# Patient Record
Sex: MALE | Race: WHITE | NOT HISPANIC OR LATINO | Employment: FULL TIME
[De-identification: names, ages, dates, MRNs, and addresses within clinical notes are randomized per-mention and may not be internally consistent; named-entity substitution may affect disease eponyms.]

---

## 2017-01-18 ENCOUNTER — RECORDS - HEALTHEAST (OUTPATIENT)
Dept: ADMINISTRATIVE | Facility: OTHER | Age: 27
End: 2017-01-18

## 2017-04-26 ENCOUNTER — RECORDS - HEALTHEAST (OUTPATIENT)
Dept: ADMINISTRATIVE | Facility: OTHER | Age: 27
End: 2017-04-26

## 2017-09-15 ENCOUNTER — RECORDS - HEALTHEAST (OUTPATIENT)
Dept: ADMINISTRATIVE | Facility: OTHER | Age: 27
End: 2017-09-15

## 2017-10-30 ENCOUNTER — RECORDS - HEALTHEAST (OUTPATIENT)
Dept: ADMINISTRATIVE | Facility: OTHER | Age: 27
End: 2017-10-30

## 2017-12-20 ENCOUNTER — RECORDS - HEALTHEAST (OUTPATIENT)
Dept: ADMINISTRATIVE | Facility: OTHER | Age: 27
End: 2017-12-20

## 2018-01-26 ENCOUNTER — RECORDS - HEALTHEAST (OUTPATIENT)
Dept: ADMINISTRATIVE | Facility: OTHER | Age: 28
End: 2018-01-26

## 2018-03-09 ENCOUNTER — RECORDS - HEALTHEAST (OUTPATIENT)
Dept: ADMINISTRATIVE | Facility: OTHER | Age: 28
End: 2018-03-09

## 2018-04-24 ENCOUNTER — RECORDS - HEALTHEAST (OUTPATIENT)
Dept: ADMINISTRATIVE | Facility: OTHER | Age: 28
End: 2018-04-24

## 2018-09-26 ENCOUNTER — RECORDS - HEALTHEAST (OUTPATIENT)
Dept: ADMINISTRATIVE | Facility: OTHER | Age: 28
End: 2018-09-26

## 2018-11-07 ENCOUNTER — OFFICE VISIT (OUTPATIENT)
Dept: INTERNAL MEDICINE | Facility: CLINIC | Age: 28
End: 2018-11-07
Payer: COMMERCIAL

## 2018-11-07 VITALS
WEIGHT: 188.5 LBS | BODY MASS INDEX: 22.95 KG/M2 | OXYGEN SATURATION: 97 % | TEMPERATURE: 98.7 F | RESPIRATION RATE: 16 BRPM | HEART RATE: 95 BPM | SYSTOLIC BLOOD PRESSURE: 128 MMHG | HEIGHT: 76 IN | DIASTOLIC BLOOD PRESSURE: 62 MMHG

## 2018-11-07 DIAGNOSIS — F32.A DEPRESSIVE DISORDER: ICD-10-CM

## 2018-11-07 DIAGNOSIS — Z00.00 HEALTH CARE MAINTENANCE: Primary | ICD-10-CM

## 2018-11-07 DIAGNOSIS — F41.9 ANXIETY: ICD-10-CM

## 2018-11-07 LAB
ALT SERPL W P-5'-P-CCNC: 47 U/L (ref 0–70)
ANION GAP SERPL CALCULATED.3IONS-SCNC: 8 MMOL/L (ref 3–14)
BUN SERPL-MCNC: 11 MG/DL (ref 7–30)
CALCIUM SERPL-MCNC: 9.5 MG/DL (ref 8.5–10.1)
CHLORIDE SERPL-SCNC: 103 MMOL/L (ref 94–109)
CHOLEST SERPL-MCNC: 236 MG/DL
CO2 SERPL-SCNC: 28 MMOL/L (ref 20–32)
CREAT SERPL-MCNC: 0.96 MG/DL (ref 0.66–1.25)
ERYTHROCYTE [DISTWIDTH] IN BLOOD BY AUTOMATED COUNT: 12.3 % (ref 10–15)
GFR SERPL CREATININE-BSD FRML MDRD: >90 ML/MIN/1.7M2
GLUCOSE SERPL-MCNC: 75 MG/DL (ref 70–99)
HCT VFR BLD AUTO: 43.8 % (ref 40–53)
HDLC SERPL-MCNC: 39 MG/DL
HGB BLD-MCNC: 15.2 G/DL (ref 13.3–17.7)
LDLC SERPL CALC-MCNC: 153 MG/DL
MCH RBC QN AUTO: 32.2 PG (ref 26.5–33)
MCHC RBC AUTO-ENTMCNC: 34.7 G/DL (ref 31.5–36.5)
MCV RBC AUTO: 93 FL (ref 78–100)
NONHDLC SERPL-MCNC: 197 MG/DL
PLATELET # BLD AUTO: 179 10E9/L (ref 150–450)
POTASSIUM SERPL-SCNC: 4 MMOL/L (ref 3.4–5.3)
RBC # BLD AUTO: 4.72 10E12/L (ref 4.4–5.9)
SODIUM SERPL-SCNC: 139 MMOL/L (ref 133–144)
TRIGL SERPL-MCNC: 219 MG/DL
TSH SERPL DL<=0.005 MIU/L-ACNC: 2.56 MU/L (ref 0.4–4)
WBC # BLD AUTO: 5.3 10E9/L (ref 4–11)

## 2018-11-07 PROCEDURE — 99395 PREV VISIT EST AGE 18-39: CPT | Performed by: NURSE PRACTITIONER

## 2018-11-07 PROCEDURE — 85027 COMPLETE CBC AUTOMATED: CPT | Performed by: NURSE PRACTITIONER

## 2018-11-07 PROCEDURE — 99385 PREV VISIT NEW AGE 18-39: CPT | Performed by: NURSE PRACTITIONER

## 2018-11-07 PROCEDURE — 80061 LIPID PANEL: CPT | Performed by: NURSE PRACTITIONER

## 2018-11-07 PROCEDURE — 36415 COLL VENOUS BLD VENIPUNCTURE: CPT | Performed by: NURSE PRACTITIONER

## 2018-11-07 PROCEDURE — 84460 ALANINE AMINO (ALT) (SGPT): CPT | Performed by: NURSE PRACTITIONER

## 2018-11-07 PROCEDURE — 84443 ASSAY THYROID STIM HORMONE: CPT | Performed by: NURSE PRACTITIONER

## 2018-11-07 PROCEDURE — 80048 BASIC METABOLIC PNL TOTAL CA: CPT | Performed by: NURSE PRACTITIONER

## 2018-11-07 RX ORDER — ESCITALOPRAM OXALATE 10 MG/1
10 TABLET ORAL DAILY
Qty: 90 TABLET | Refills: 1 | Status: SHIPPED | OUTPATIENT
Start: 2018-11-07 | End: 2021-08-03

## 2018-11-07 ASSESSMENT — ENCOUNTER SYMPTOMS
ABDOMINAL PAIN: 0
ARTHRALGIAS: 0
NAUSEA: 0
HEMATOCHEZIA: 0
CHILLS: 0
DIARRHEA: 0
EYE PAIN: 0
FEVER: 0
HEARTBURN: 1
FREQUENCY: 0
NERVOUS/ANXIOUS: 1
HEADACHES: 0
SHORTNESS OF BREATH: 0
WEAKNESS: 0
COUGH: 0
CONSTIPATION: 0
DYSURIA: 0
SORE THROAT: 0
PALPITATIONS: 0
HEMATURIA: 0
DIZZINESS: 1
PARESTHESIAS: 0
MYALGIAS: 0
JOINT SWELLING: 0

## 2018-11-07 ASSESSMENT — ANXIETY QUESTIONNAIRES
IF YOU CHECKED OFF ANY PROBLEMS ON THIS QUESTIONNAIRE, HOW DIFFICULT HAVE THESE PROBLEMS MADE IT FOR YOU TO DO YOUR WORK, TAKE CARE OF THINGS AT HOME, OR GET ALONG WITH OTHER PEOPLE: SOMEWHAT DIFFICULT
6. BECOMING EASILY ANNOYED OR IRRITABLE: NEARLY EVERY DAY
GAD7 TOTAL SCORE: 14
5. BEING SO RESTLESS THAT IT IS HARD TO SIT STILL: SEVERAL DAYS
1. FEELING NERVOUS, ANXIOUS, OR ON EDGE: NEARLY EVERY DAY
7. FEELING AFRAID AS IF SOMETHING AWFUL MIGHT HAPPEN: MORE THAN HALF THE DAYS
2. NOT BEING ABLE TO STOP OR CONTROL WORRYING: SEVERAL DAYS
3. WORRYING TOO MUCH ABOUT DIFFERENT THINGS: MORE THAN HALF THE DAYS

## 2018-11-07 ASSESSMENT — PATIENT HEALTH QUESTIONNAIRE - PHQ9
SUM OF ALL RESPONSES TO PHQ QUESTIONS 1-9: 13
5. POOR APPETITE OR OVEREATING: MORE THAN HALF THE DAYS
10. IF YOU CHECKED OFF ANY PROBLEMS, HOW DIFFICULT HAVE THESE PROBLEMS MADE IT FOR YOU TO DO YOUR WORK, TAKE CARE OF THINGS AT HOME, OR GET ALONG WITH OTHER PEOPLE: SOMEWHAT DIFFICULT
SUM OF ALL RESPONSES TO PHQ QUESTIONS 1-9: 13

## 2018-11-07 NOTE — MR AVS SNAPSHOT
After Visit Summary   11/7/2018    Landen Biswas    MRN: [id number]           Patient Information     Date Of Birth          1990        Visit Information        Provider Department      11/7/2018 7:00 AM Lilly Hester NP [facility]        Today's Diagnoses     Health care maintenance    -  1    Depressive disorder        Anxiety          Care Instructions      Preventive Health Recommendations  Male Ages 26 - 39    Yearly exam:             See your health care provider every year in order to  o   Review health changes.   o   Discuss preventive care.    o   Review your medicines if your doctor has prescribed any.    You should be tested each year for STDs (sexually transmitted diseases), if you re at risk.     After age 35, talk to your provider about cholesterol testing. If you are at risk for heart disease, have your cholesterol tested at least every 5 years.     If you are at risk for diabetes, you should have a diabetes test (fasting glucose).  Shots: Get a flu shot each year. Get a tetanus shot every 10 years.     Nutrition:    Eat at least 5 servings of fruits and vegetables daily.     Eat whole-grain bread, whole-wheat pasta and brown rice instead of white grains and rice.     Get adequate Calcium and Vitamin D.     Lifestyle    Exercise for at least 150 minutes a week (30 minutes a day, 5 days a week). This will help you control your weight and prevent disease.     Limit alcohol to one drink per day.     No smoking.     Wear sunscreen to prevent skin cancer.     See your dentist every six months for an exam and cleaning.             Follow-ups after your visit        Follow-up notes from your care team     Return in about 3 months (around 2/7/2019).      Who to contact     If you have questions or need follow up information about today's clinic visit or your schedule please contact [facility] directly at [phone].  Normal or non-critical lab and  "imaging results will be communicated to you by MyChart, letter or phone within 4 business days after the clinic has received the results. If you do not hear from us within 7 days, please contact the clinic through MyChart or phone. If you have a critical or abnormal lab result, we will notify you by phone as soon as possible.  Submit refill requests through [facility] or call your pharmacy and they will forward the refill request to us. Please allow 3 business days for your refill to be completed.          Additional Information About Your Visit        Care EveryWhere ID     This is your Care EveryWhere ID. This could be used by other organizations to access your [place] medical records  [id number]        Your Vitals Were     Pulse Temperature Respirations Height Pulse Oximetry BMI (Body Mass Index)    95 98.7  F (37.1  C) (Oral) 16 6' 4\" (1.93 m) 97% 22.94 kg/m2       Blood Pressure from Last 3 Encounters:   11/07/18 128/62    Weight from Last 3 Encounters:   11/07/18 188 lb 8 oz (85.5 kg)              We Performed the Following     ALT     Basic metabolic panel     CBC with platelets     Lipid Profile     TSH with free T4 reflex          Today's Medication Changes          These changes are accurate as of 11/7/18  8:12 AM.  If you have any questions, ask your nurse or doctor.               These medicines have changed or have updated prescriptions.        Dose/Directions    escitalopram 10 MG tablet   Commonly known as:  LEXAPRO   This may have changed:    - medication strength  - how much to take   Used for:  Anxiety, Depressive disorder   Changed by:  Lilly Hester, JOSHUA        Dose:  10 mg   Take 1 tablet (10 mg) by mouth daily   Quantity:  90 tablet   Refills:  1            Where to get your medicines      These medications were sent to [facility]/pharmacy #[id number] [place], [place] - [address], [place] [address]     Phone:  [phone]     escitalopram 10 MG tablet             "    Primary Care Provider Fax #    Physician No Ref-Primary [phone]       No address on file        Equal Access to Services     JOSE R CARROLL : Hadii aad ku hadkesha Hernandez, raquelda jovanybrandy, tiffany patelda zoltan, roscoe courtneyin hayaaernestine abarcachristopher cronin laMerakamaljit mujica. So [facility] [phone].    ATENCIÓN: Si habla español, tiene a flores disposición servicios gratuitos de asistencia lingüística. Llame al [phone].    We comply with applicable federal civil rights laws and [place] laws. We do not discriminate on the basis of race, color, national origin, age, disability, sex, sexual orientation, or gender identity.            Thank you!     Thank you for choosing [facility]  for your care. Our goal is always to provide you with excellent care. Hearing back from our patients is one way we can continue to improve our services. Please take a few minutes to complete the written survey that you may receive in the mail after your visit with us. Thank you!             Your Updated Medication List - Protect others around you: Learn how to safely use, store and throw away your medicines at www.disposemymeds.org.          This list is accurate as of 11/7/18  8:12 AM.  Always use your most recent med list.                   Brand Name Dispense Instructions for use Diagnosis    escitalopram 10 MG tablet    LEXAPRO    90 tablet    Take 1 tablet (10 mg) by mouth daily    Anxiety, Depressive disorder       XANAX PO      Take 0.5 mg by mouth as needed for anxiety

## 2018-11-07 NOTE — LETTER
November 7, 2018      Landen Biswas  [address]  [place] [address]        Dear ,    We are writing to inform you of your test results.    Your cholesterol is significantly elevated. I would like you to work harder on your diet for now. You will need a follow up fasting cholesterol in 6 months. The rest of your results were in normal range.     Resulted Orders   CBC with platelets   Result Value Ref Range    WBC 5.3 4.0 - 11.0 10e9/L    RBC Count 4.72 4.4 - 5.9 10e12/L    Hemoglobin 15.2 13.3 - 17.7 g/dL    Hematocrit 43.8 40.0 - 53.0 %    MCV 93 78 - 100 fl    MCH 32.2 26.5 - 33.0 pg    MCHC 34.7 31.5 - 36.5 g/dL    RDW 12.3 10.0 - 15.0 %    Platelet Count 179 150 - 450 10e9/L   Basic metabolic panel   Result Value Ref Range    Sodium 139 133 - 144 mmol/L    Potassium 4.0 3.4 - 5.3 mmol/L    Chloride 103 94 - 109 mmol/L    Carbon Dioxide 28 20 - 32 mmol/L    Anion Gap 8 3 - 14 mmol/L    Glucose 75 70 - 99 mg/dL      Comment:      Fasting specimen    Urea Nitrogen 11 7 - 30 mg/dL    Creatinine 0.96 0.66 - 1.25 mg/dL    GFR Estimate >90 >60 mL/min/1.7m2      Comment:      Non  GFR Calc    GFR Estimate If Black >90 >60 mL/min/1.7m2      Comment:       GFR Calc    Calcium 9.5 8.5 - 10.1 mg/dL   ALT   Result Value Ref Range    ALT 47 0 - 70 U/L   Lipid Profile   Result Value Ref Range    Cholesterol 236 (H) <200 mg/dL      Comment:      Desirable:       <200 mg/dl    Triglycerides 219 (H) <150 mg/dL      Comment:      Borderline high:  150-199 mg/dl  High:             200-499 mg/dl  Very high:       >499 mg/dl  Fasting specimen      HDL Cholesterol 39 (L) >39 mg/dL    LDL Cholesterol Calculated 153 (H) <100 mg/dL      Comment:      Above desirable:  100-129 mg/dl  Borderline High:  130-159 mg/dL  High:             160-189 mg/dL  Very high:       >189 mg/dl      Non HDL Cholesterol 197 (H) <130 mg/dL      Comment:      Above Desirable:  130-159 mg/dl  Borderline high:   160-189 mg/dl  High:             190-219 mg/dl  Very high:       >219 mg/dl     TSH with free T4 reflex   Result Value Ref Range    TSH 2.56 0.40 - 4.00 mU/L       If you have any questions or concerns, please call the clinic at the number listed above.       Sincerely,        Lilly Hester NP

## 2018-11-07 NOTE — PROGRESS NOTES
SUBJECTIVE:   CC: Landen Biswas is an 28 year old male who presents for preventative health visit.     Physical   Annual:     Getting at least 3 servings of Calcium per day:  Yes    Bi-annual eye exam:  Yes    Dental care twice a year:  Yes    Sleep apnea or symptoms of sleep apnea:  Sleep apnea    Diet:  Regular (no restrictions)    Frequency of exercise:  1 day/week    Duration of exercise:  15-30 minutes    Taking medications regularly:  Yes    Medication side effects:  None    Additional concerns today:  Yes        Depression and Anxiety Follow-Up    Status since last visit: No change    Other associated symptoms:None    Complicating factors:     Significant life event: recent relocation to [place]     Current substance abuse: None    PHQ 11/7/2018   PHQ-9 Total Score 13   Q9: Suicide Ideation Not at all     No flowsheet data found.    PHQ-9  English  PHQ-9   Any Language  CIERRA-7  Suicide Assessment Five-step Evaluation and Treatment (SAFE-T)    Today's PHQ-2 Score:   PHQ-2 ( 1999 [employer]) 11/7/2018   Q1: Little interest or pleasure in doing things 2   Q2: Feeling down, depressed or hopeless 2   PHQ-2 Score 4   Q1: Little interest or pleasure in doing things More than half the days   Q2: Feeling down, depressed or hopeless More than half the days   PHQ-2 Score 4       Abuse: Current or Past(Physical, Sexual or Emotional)- No  Do you feel safe in your environment - Yes    Social History   Substance Use Topics     Smoking status: Current Every Day Smoker     Smokeless tobacco: Never Used     Alcohol use Yes      Comment: socially     Alcohol Use 11/7/2018   If you drink alcohol do you typically have greater than 3 drinks per day OR greater than 7 drinks per week? No       Last PSA: No results found for: PSA    Reviewed orders with patient. Reviewed health maintenance and updated orders accordingly - Yes  BP Readings from Last 3 Encounters:   11/07/18 128/62    Wt Readings from Last 3 Encounters:   11/07/18 188 lb 8 oz (85.5  kg)                  Patient Active Problem List   Diagnosis     Depressive disorder     Anxiety     Past Surgical History:   Procedure Laterality Date     TONSILLECTOMY         Social History   Substance Use Topics     Smoking status: Current Every Day Smoker     Packs/day: 0.50     Years: 8.00     Smokeless tobacco: Never Used     Alcohol use Yes      Comment: 5 per week     Family History   Problem Relation Age of Onset     Chronic Obstructive Pulmonary Disease Maternal Grandmother          Current Outpatient Prescriptions   Medication Sig Dispense Refill     ALPRAZolam (XANAX PO) Take 0.5 mg by mouth as needed for anxiety       escitalopram (LEXAPRO) 10 MG tablet Take 1 tablet (10 mg) by mouth daily 90 tablet 1     [DISCONTINUED] Escitalopram Oxalate (LEXAPRO PO) Take 5 mg by mouth daily         Reviewed and updated as needed this visit by clinical staff  Tobacco  Allergies  Meds  Med Hx  Surg Hx  Fam Hx  Soc Hx        Reviewed and updated as needed this visit by Provider            Review of Systems   Constitutional: Negative for chills and fever.   HENT: Positive for congestion. Negative for ear pain, hearing loss and sore throat.    Eyes: Negative for pain and visual disturbance.   Respiratory: Negative for cough and shortness of breath.    Cardiovascular: Negative for chest pain, palpitations and peripheral edema.   Gastrointestinal: Positive for heartburn. Negative for abdominal pain, constipation, diarrhea, hematochezia and nausea.   Genitourinary: Negative for discharge, dysuria, frequency, genital sores, hematuria, impotence and urgency.   Musculoskeletal: Negative for arthralgias, joint swelling and myalgias.   Skin: Negative for rash.   Neurological: Positive for dizziness. Negative for weakness, headaches and paresthesias.   Psychiatric/Behavioral: Positive for mood changes. The patient is nervous/anxious.          OBJECTIVE:   /62 (BP Location: Right arm, Patient Position: Sitting, Cuff  "Size: Adult Large)  Pulse 95  Temp 98.7  F (37.1  C) (Oral)  Resp 16  Ht 6' 4\" (1.93 m)  Wt 188 lb 8 oz (85.5 kg)  SpO2 97%  BMI 22.94 kg/m2    Physical Exam  GENERAL: healthy, alert and no distress  NECK: no adenopathy, no asymmetry, masses, or scars and thyroid normal to palpation  RESP: lungs clear to auscultation - no rales, rhonchi or wheezes  CV: regular rate and rhythm, normal S1 S2, no S3 or S4, no murmur, click or rub, no peripheral edema and peripheral pulses strong  ABDOMEN: soft, nontender, no hepatosplenomegaly, no masses and bowel sounds normal  MS: no gross musculoskeletal defects noted, no edema  PSYCH: mentation appears normal, affect normal/bright and anxious        ASSESSMENT/PLAN:       ICD-10-CM    1. Health care maintenance Z00.00 CBC with platelets     Basic metabolic panel     ALT     Lipid Profile     TSH with free T4 reflex   2. Depressive disorder F32.9 escitalopram (LEXAPRO) 10 MG tablet   3. Anxiety F41.9 escitalopram (LEXAPRO) 10 MG tablet       COUNSELING:   Reviewed preventive health counseling, as reflected in patient instructions    BP Readings from Last 1 Encounters:   11/07/18 128/62     Estimated body mass index is 22.94 kg/(m^2) as calculated from the following:    Height as of this encounter: 6' 4\" (1.93 m).    Weight as of this encounter: 188 lb 8 oz (85.5 kg).           reports that he has been smoking.  He has never used smokeless tobacco.  Tobacco Cessation Action Plan: Information offered: Patient not interested at this time    Counseling Resources:  ATP IV Guidelines  Pooled Cohorts Equation Calculator  FRAX Risk Assessment  ICSI Preventive Guidelines  Dietary Guidelines for Americans, 2010  USDA's MyPlate  ASA Prophylaxis  Lung CA Screening    Lilly Hester NP  [facility]  Answers for HPI/ROS submitted by the patient on 11/7/2018   PHQ-2 Score: 4  If you checked off any problems, how difficult have these problems made it for you to do your " work, take care of things at home, or get along with other people?: Somewhat difficult  PHQ9 TOTAL SCORE: 13

## 2018-11-08 ASSESSMENT — ANXIETY QUESTIONNAIRES: GAD7 TOTAL SCORE: 14

## 2019-03-19 ENCOUNTER — OFFICE VISIT - HEALTHEAST (OUTPATIENT)
Dept: INTERNAL MEDICINE | Facility: CLINIC | Age: 29
End: 2019-03-19

## 2019-03-19 ENCOUNTER — COMMUNICATION - HEALTHEAST (OUTPATIENT)
Dept: INTERNAL MEDICINE | Facility: CLINIC | Age: 29
End: 2019-03-19

## 2019-03-19 ENCOUNTER — COMMUNICATION - HEALTHEAST (OUTPATIENT)
Dept: TELEHEALTH | Facility: CLINIC | Age: 29
End: 2019-03-19

## 2019-03-19 DIAGNOSIS — R03.0 PREHYPERTENSION: ICD-10-CM

## 2019-03-19 DIAGNOSIS — F41.9 ANXIETY: ICD-10-CM

## 2019-03-19 DIAGNOSIS — F33.0 MILD EPISODE OF RECURRENT MAJOR DEPRESSIVE DISORDER (H): ICD-10-CM

## 2019-03-19 DIAGNOSIS — Z00.00 ROUTINE GENERAL MEDICAL EXAMINATION AT A HEALTH CARE FACILITY: ICD-10-CM

## 2019-03-19 LAB
ALBUMIN SERPL-MCNC: 4.3 G/DL (ref 3.5–5)
ALBUMIN UR-MCNC: NEGATIVE MG/DL
ALP SERPL-CCNC: 70 U/L (ref 45–120)
ALT SERPL W P-5'-P-CCNC: 60 U/L (ref 0–45)
ANION GAP SERPL CALCULATED.3IONS-SCNC: 7 MMOL/L (ref 5–18)
APPEARANCE UR: CLEAR
AST SERPL W P-5'-P-CCNC: 39 U/L (ref 0–40)
BACTERIA #/AREA URNS HPF: ABNORMAL HPF
BILIRUB SERPL-MCNC: 1.3 MG/DL (ref 0–1)
BILIRUB UR QL STRIP: NEGATIVE
BUN SERPL-MCNC: 10 MG/DL (ref 8–22)
CALCIUM SERPL-MCNC: 9.9 MG/DL (ref 8.5–10.5)
CHLORIDE BLD-SCNC: 104 MMOL/L (ref 98–107)
CHOLEST SERPL-MCNC: 220 MG/DL
CO2 SERPL-SCNC: 28 MMOL/L (ref 22–31)
COLOR UR AUTO: YELLOW
CREAT SERPL-MCNC: 0.89 MG/DL (ref 0.7–1.3)
ERYTHROCYTE [DISTWIDTH] IN BLOOD BY AUTOMATED COUNT: 11.8 % (ref 11–14.5)
FASTING STATUS PATIENT QL REPORTED: YES
GFR SERPL CREATININE-BSD FRML MDRD: >60 ML/MIN/1.73M2
GLUCOSE BLD-MCNC: 85 MG/DL (ref 70–125)
GLUCOSE UR STRIP-MCNC: NEGATIVE MG/DL
HCT VFR BLD AUTO: 44.2 % (ref 40–54)
HDLC SERPL-MCNC: 37 MG/DL
HGB BLD-MCNC: 15.4 G/DL (ref 14–18)
HGB UR QL STRIP: ABNORMAL
KETONES UR STRIP-MCNC: NEGATIVE MG/DL
LDLC SERPL CALC-MCNC: 147 MG/DL
LEUKOCYTE ESTERASE UR QL STRIP: NEGATIVE
MCH RBC QN AUTO: 31.5 PG (ref 27–34)
MCHC RBC AUTO-ENTMCNC: 34.8 G/DL (ref 32–36)
MCV RBC AUTO: 90 FL (ref 80–100)
NITRATE UR QL: NEGATIVE
PH UR STRIP: 8.5 [PH] (ref 5–8)
PLATELET # BLD AUTO: 191 THOU/UL (ref 140–440)
PMV BLD AUTO: 8.5 FL (ref 7–10)
POTASSIUM BLD-SCNC: 4.1 MMOL/L (ref 3.5–5)
PROT SERPL-MCNC: 7.1 G/DL (ref 6–8)
RBC # BLD AUTO: 4.89 MILL/UL (ref 4.4–6.2)
RBC #/AREA URNS AUTO: ABNORMAL HPF
SODIUM SERPL-SCNC: 139 MMOL/L (ref 136–145)
SP GR UR STRIP: 1.01 (ref 1–1.03)
SQUAMOUS #/AREA URNS AUTO: ABNORMAL LPF
TRIGL SERPL-MCNC: 179 MG/DL
UROBILINOGEN UR STRIP-ACNC: ABNORMAL
WBC #/AREA URNS AUTO: ABNORMAL HPF
WBC: 5.6 THOU/UL (ref 4–11)

## 2019-03-19 ASSESSMENT — MIFFLIN-ST. JEOR: SCORE: 1894.52

## 2019-03-20 ENCOUNTER — COMMUNICATION - HEALTHEAST (OUTPATIENT)
Dept: INTERNAL MEDICINE | Facility: CLINIC | Age: 29
End: 2019-03-20

## 2019-04-16 ENCOUNTER — OFFICE VISIT - HEALTHEAST (OUTPATIENT)
Dept: INTERNAL MEDICINE | Facility: CLINIC | Age: 29
End: 2019-04-16

## 2019-04-16 DIAGNOSIS — L02.31 ABSCESS OF LEFT BUTTOCK: ICD-10-CM

## 2019-04-16 ASSESSMENT — MIFFLIN-ST. JEOR: SCORE: 1904.14

## 2019-04-19 ENCOUNTER — OFFICE VISIT - HEALTHEAST (OUTPATIENT)
Dept: SURGERY | Facility: CLINIC | Age: 29
End: 2019-04-19

## 2019-04-19 DIAGNOSIS — L02.31 CUTANEOUS ABSCESS OF BUTTOCK: ICD-10-CM

## 2019-04-19 ASSESSMENT — MIFFLIN-ST. JEOR: SCORE: 1894.52

## 2019-05-29 ENCOUNTER — COMMUNICATION - HEALTHEAST (OUTPATIENT)
Dept: INTERNAL MEDICINE | Facility: CLINIC | Age: 29
End: 2019-05-29

## 2019-05-29 DIAGNOSIS — F41.9 ANXIETY: ICD-10-CM

## 2019-12-26 ENCOUNTER — COMMUNICATION - HEALTHEAST (OUTPATIENT)
Dept: INTERNAL MEDICINE | Facility: CLINIC | Age: 29
End: 2019-12-26

## 2019-12-26 DIAGNOSIS — F41.9 ANXIETY: ICD-10-CM

## 2020-02-18 ENCOUNTER — OFFICE VISIT - HEALTHEAST (OUTPATIENT)
Dept: INTERNAL MEDICINE | Facility: CLINIC | Age: 30
End: 2020-02-18

## 2020-02-18 DIAGNOSIS — E78.2 MIXED HYPERLIPIDEMIA: ICD-10-CM

## 2020-02-18 DIAGNOSIS — R03.0 ELEVATED BLOOD PRESSURE READING WITHOUT DIAGNOSIS OF HYPERTENSION: ICD-10-CM

## 2020-02-18 DIAGNOSIS — Z23 NEED FOR PROPHYLACTIC VACCINATION AND INOCULATION AGAINST INFLUENZA: ICD-10-CM

## 2020-02-18 DIAGNOSIS — N52.9 ERECTILE DYSFUNCTION, UNSPECIFIED ERECTILE DYSFUNCTION TYPE: ICD-10-CM

## 2020-02-18 ASSESSMENT — MIFFLIN-ST. JEOR: SCORE: 1799.45

## 2020-02-18 ASSESSMENT — PATIENT HEALTH QUESTIONNAIRE - PHQ9: SUM OF ALL RESPONSES TO PHQ QUESTIONS 1-9: 3

## 2020-03-20 ENCOUNTER — OFFICE VISIT - HEALTHEAST (OUTPATIENT)
Dept: INTERNAL MEDICINE | Facility: CLINIC | Age: 30
End: 2020-03-20

## 2020-03-20 ENCOUNTER — COMMUNICATION - HEALTHEAST (OUTPATIENT)
Dept: INTERNAL MEDICINE | Facility: CLINIC | Age: 30
End: 2020-03-20

## 2020-03-20 DIAGNOSIS — B02.9 HERPES ZOSTER WITHOUT COMPLICATION: ICD-10-CM

## 2020-03-25 ENCOUNTER — COMMUNICATION - HEALTHEAST (OUTPATIENT)
Dept: INTERNAL MEDICINE | Facility: CLINIC | Age: 30
End: 2020-03-25

## 2020-03-25 DIAGNOSIS — F41.9 ANXIETY: ICD-10-CM

## 2020-03-30 ENCOUNTER — COMMUNICATION - HEALTHEAST (OUTPATIENT)
Dept: INTERNAL MEDICINE | Facility: CLINIC | Age: 30
End: 2020-03-30

## 2020-03-30 DIAGNOSIS — F41.9 ANXIETY: ICD-10-CM

## 2020-03-30 DIAGNOSIS — F32.A DEPRESSIVE DISORDER: ICD-10-CM

## 2020-05-17 ENCOUNTER — COMMUNICATION - HEALTHEAST (OUTPATIENT)
Dept: INTERNAL MEDICINE | Facility: CLINIC | Age: 30
End: 2020-05-17

## 2020-05-17 DIAGNOSIS — F41.9 ANXIETY: ICD-10-CM

## 2020-09-04 ENCOUNTER — COMMUNICATION - HEALTHEAST (OUTPATIENT)
Dept: INTERNAL MEDICINE | Facility: CLINIC | Age: 30
End: 2020-09-04

## 2020-09-04 DIAGNOSIS — F41.9 ANXIETY: ICD-10-CM

## 2021-02-02 ENCOUNTER — COMMUNICATION - HEALTHEAST (OUTPATIENT)
Dept: INTERNAL MEDICINE | Facility: CLINIC | Age: 31
End: 2021-02-02

## 2021-02-02 DIAGNOSIS — F41.9 ANXIETY: ICD-10-CM

## 2021-04-02 ENCOUNTER — COMMUNICATION - HEALTHEAST (OUTPATIENT)
Dept: INTERNAL MEDICINE | Facility: CLINIC | Age: 31
End: 2021-04-02

## 2021-04-02 DIAGNOSIS — F41.9 ANXIETY: ICD-10-CM

## 2021-04-02 DIAGNOSIS — F32.A DEPRESSIVE DISORDER: ICD-10-CM

## 2021-05-03 ENCOUNTER — RECORDS - HEALTHEAST (OUTPATIENT)
Dept: INTERNAL MEDICINE | Facility: CLINIC | Age: 31
End: 2021-05-03

## 2021-05-26 ASSESSMENT — PATIENT HEALTH QUESTIONNAIRE - PHQ9: SUM OF ALL RESPONSES TO PHQ QUESTIONS 1-9: 3

## 2021-05-27 NOTE — PROGRESS NOTES
Office Visit - Follow Up   Landen Biswas   28 y.o. male    Date of Visit: 4/16/2019    Chief Complaint   Patient presents with     Recurrent Skin Infections     LT buttocks X1 week, painful, red drainage        Assessment and Plan   1. Abscess of left buttock  Has a tender lump on the left buttock and on exam consistent with abscess.  It is fluctuant, red and mildly tender on palpation.  Will treat with cephalexin.  Advised he needs I&D for this.  Will refer to general surgery.  - cephalexin (KEFLEX) 500 MG capsule; Take 1 capsule (500 mg total) by mouth 4 (four) times a day for 10 days.  Dispense: 40 capsule; Refill: 0  - Ambulatory referral to General Surgery    Has controlled major depression with Lexapro.  Borderline blood pressure found on his last visit is now normal.    Follow up in in 3 months as previously scheduled.     History of Present Illness   This 28 y.o. old male came primarily for a tender lump on his left buttock.  The lowest started 3 days ago.  Noted some discharge.  Thinks he has a boil.  Has similar problems in the past which occur in his groin.  Denies fever.  Able to squeeze out some yellow discharge yesterday.  Afraid he has recurrence of his boil.  Has major depression now in remission with Lexapro.  Showed borderline blood pressure on his last visit but today his blood pressures well controlled.    Review of Systems   A 12 point comprehensive review of systems was negative except as noted..     Medications, Allergies and Problem List   Reviewed and updated             Chief Complaint   Recurrent Skin Infections (LT buttocks X1 week, painful, red drainage)       Patient Profile   Social History     Social History Narrative    Single. Domestic partner for 1 year. Homosexual. [employer] manager, [employer]. Used to be . Socially drinks alcohol. Smokes 1/2 pack of cigarettes a day.         Past Medical History   Patient Active Problem List   Diagnosis     Anxiety     Depressive  "disorder       Past Surgical History  He has no past surgical history on file.       Medications and Allergies   Current Outpatient Medications   Medication Sig     ALPRAZolam (XANAX) 0.5 MG tablet Take 1 tablet (0.5 mg total) by mouth once as needed for sleep or anxiety.     escitalopram oxalate (LEXAPRO) 10 MG tablet Take 1 tablet (10 mg total) by mouth daily.     cephalexin (KEFLEX) 500 MG capsule Take 1 capsule (500 mg total) by mouth 4 (four) times a day for 10 days.     No Known Allergies     Family and Social History   No family history on file.     Social History     Tobacco Use     Smoking status: Current Every Day Smoker     Packs/day: 0.50     Types: Cigarettes     Smokeless tobacco: Never Used   Substance Use Topics     Alcohol use: Yes     Alcohol/week: 1.8 oz     Types: 3 Shots of liquor per week     Frequency: Monthly or less     Drinks per session: 1 or 2     Binge frequency: Less than monthly     Drug use: Not on file        Physical Exam       Physical Exam  /72 (Patient Site: Left Arm, Patient Position: Sitting, Cuff Size: Adult Regular)   Pulse 91   Ht 6' 2.5\" (1.892 m)   Wt 192 lb 1.9 oz (87.1 kg)   SpO2 98%   BMI 24.34 kg/m    General appearance: alert, appears stated age, cooperative and no distress  Head: Normocephalic, without obvious abnormality, atraumatic  Lungs: clear to auscultation bilaterally  Heart: regular rate and rhythm, S1, S2 normal, no murmur, click, rub or gallop  Abdomen: soft, non-tender; bowel sounds normal; no masses,  no organomegaly  Extremities: extremities normal, atraumatic, no cyanosis or edema  Skin: Skin color, texture, turgor normal. No rashes or lesions  Buttocks: A fluctuant, red, mildly tender abscess on the mid area of the left buttock     Additional Information        Tank Clay MD  Internal Medicine  Contact me at [phone]     Additional Information   Current Outpatient Medications   Medication Sig     ALPRAZolam (XANAX) 0.5 MG tablet " Take 1 tablet (0.5 mg total) by mouth once as needed for sleep or anxiety.     escitalopram oxalate (LEXAPRO) 10 MG tablet Take 1 tablet (10 mg total) by mouth daily.     cephalexin (KEFLEX) 500 MG capsule Take 1 capsule (500 mg total) by mouth 4 (four) times a day for 10 days.     No Known Allergies  Social History     Tobacco Use     Smoking status: Current Every Day Smoker     Packs/day: 0.50     Types: Cigarettes     Smokeless tobacco: Never Used   Substance Use Topics     Alcohol use: Yes     Alcohol/week: 1.8 oz     Types: 3 Shots of liquor per week     Frequency: Monthly or less     Drinks per session: 1 or 2     Binge frequency: Less than monthly     Drug use: Not on file         Time: total time spent with the patient was 15 minutes of which >50% was spent in counseling and coordination of care

## 2021-05-28 NOTE — PROGRESS NOTES
HPI: Landen Biswas is a 28 y.o. male referred to see me by Tank Clay MD for evaluation of a left buttock abscess.  He developed this abscess several days ago, with drainage noted initially.  Has been quite tender to sit on.  Since seen Dr. Clay 3 days ago, he has noted improvement in pain as well as redness in the area with initiation of antibiotics.  Does still note some occasional drainage.      Has had similar cutaneous abscesses in the past, but not at this location.      Allergies:Patient has no known allergies.    Past Medical History:   Diagnosis Date     Anxiety      Depression        History reviewed. No pertinent surgical history.    CURRENT MEDS:    Current Outpatient Medications:      ALPRAZolam (XANAX) 0.5 MG tablet, Take 1 tablet (0.5 mg total) by mouth once as needed for sleep or anxiety., Disp: 30 tablet, Rfl: 0     cephalexin (KEFLEX) 500 MG capsule, Take 1 capsule (500 mg total) by mouth 4 (four) times a day for 10 days., Disp: 40 capsule, Rfl: 0     escitalopram oxalate (LEXAPRO) 10 MG tablet, Take 1 tablet (10 mg total) by mouth daily., Disp: 90 tablet, Rfl: 3    History reviewed. No pertinent family history.     reports that he has been smoking cigarettes.  He has been smoking about 0.50 packs per day. He has never used smokeless tobacco. He reports that he drinks about 1.8 oz of alcohol per week.    Review of Systems:  The 10 point review of systems  is within normal limits except for as mentioned above in the HPI.  General ROS: No complaints or constitutional symptoms  Skin: As noted in HPI  Hematologic/Lymphatic: No symptoms or complaints  Psychiatric: No symptoms or complaints  Endocrine: No excessive fatigue, no hypermetabolic symptoms reported  Respiratory ROS: no cough, shortness of breath, or wheezing  Cardiovascular ROS: no chest pain or dyspnea on exertion  Gastrointestinal ROS: GI complaints or concerns  Musculoskeletal ROS: no recent injuries reported  Neurological ROS:  "no focal neurologic defects reported.        /77   Pulse 84   Resp 16   Ht 6' 2.5\" (1.892 m)   Wt 190 lb (86.2 kg)   BMI 24.07 kg/m    Body mass index is 24.07 kg/m .    EXAM:  General : Alert, cooperative, appears stated age   Skin: Skin color, texture, turgor normal, no rashes or lesions.  On the left buttock, there is a small indurated area without significant surrounding erythema.  Area of fluctuance measuring 5 x 5 mm, with a small hole draining seropurulent fluid.  The skin around the site was injected with 1% lidocaine, and a 1 cm incision made over the fluctuant area, evacuating a small amount of purulent material.  Lymphatic: No obvious adenopathy, no swelling   Eyes: No scleral icterus, pupils equal  HENT: no traumatic injury to the head or face, no gross abnormalities  Lungs: Normal respiratory effort  Heart: Regular rate and rhythm  Musculoskeletal: No obvious swelling  Neurologic: Grossly intact      LABS:  Lab Results   Component Value Date    WBC 5.6 03/19/2019    HGB 15.4 03/19/2019    HCT 44.2 03/19/2019    MCV 90 03/19/2019     03/19/2019     INR/Prothrombin Time      Lab Results   Component Value Date    ALT 60 (H) 03/19/2019    AST 39 03/19/2019    ALKPHOS 70 03/19/2019    BILITOT 1.3 (H) 03/19/2019     Assessment/Plan:   1. Cutaneous abscess of buttock        Landen Biswas is a 28 y.o. male with a skin abscess that appear to be resolving reasonably well with antibiotics.  We did create a larger skin opening for drainage of residual fluctuant fluid.  I think this will heal quite well going forward, he is wearing contact us if the healing process Brittany.  Abraham Quevedo MD  [phone]  [facility] Department of Surgery  "

## 2021-05-28 NOTE — PROGRESS NOTES
Here for consult regarding abscess on left buttock.     Anne Marie Taylor RN, CBN  [facility]  P [phone]  F [phone]

## 2021-05-29 NOTE — TELEPHONE ENCOUNTER
Prescription Monitoring Program activity reviewed with no discrepancies noted.      Last fill per : 3/19/19  Quantity/days supply: 30 tablets for 30 days    Controlled Substance Agreement on file: No  Date: N/A    Last office visit with provider:  4/16/2019 Tank Clay MD    Please advise.

## 2021-05-29 NOTE — TELEPHONE ENCOUNTER
Controlled Substance Refill Request  Medication:   Requested Prescriptions     Pending Prescriptions Disp Refills     ALPRAZolam (XANAX) 0.5 MG tablet 30 tablet 0     Sig: Take 1 tablet (0.5 mg total) by mouth once as needed for sleep or anxiety.     Date Last Fill: 3/19/19  Pharmacy: [id number]   Submit electronically to pharmacy  Controlled Substance Agreement on File:   Encounter-Level CSA Scan Date:    There are no encounter-level csa scan date.       Last office visit: Last office visit pertaining to requested medication was 4/16/19.

## 2021-06-01 ENCOUNTER — RECORDS - HEALTHEAST (OUTPATIENT)
Dept: INTERNAL MEDICINE | Facility: CLINIC | Age: 31
End: 2021-06-01

## 2021-06-02 VITALS — WEIGHT: 192.12 LBS | HEIGHT: 75 IN | BODY MASS INDEX: 23.89 KG/M2

## 2021-06-02 VITALS — WEIGHT: 190 LBS | HEIGHT: 75 IN | BODY MASS INDEX: 23.62 KG/M2

## 2021-06-02 VITALS — BODY MASS INDEX: 23.62 KG/M2 | WEIGHT: 190 LBS | HEIGHT: 75 IN

## 2021-06-04 VITALS
OXYGEN SATURATION: 98 % | DIASTOLIC BLOOD PRESSURE: 78 MMHG | SYSTOLIC BLOOD PRESSURE: 132 MMHG | WEIGHT: 169.04 LBS | BODY MASS INDEX: 21.02 KG/M2 | HEIGHT: 75 IN | HEART RATE: 105 BPM

## 2021-06-04 NOTE — TELEPHONE ENCOUNTER
Controlled Substance Refill Request  Medication:   Requested Prescriptions     Pending Prescriptions Disp Refills     ALPRAZolam (XANAX) 0.5 MG tablet [Pharmacy Med Name: ALPRAZOLAM 0.5MG TABLETS] 30 tablet 0     Sig: TAKE 1 TABLET(0.5 MG) BY MOUTH 1 TIME AS NEEDED FOR SLEEP OR ANXIETY     Date Last Fill: 5/30/19  Pharmacy: [email or web address] [id number]   Submit electronically to pharmacy  Controlled Substance Agreement on File:   Encounter-Level CSA Scan Date:    There are no encounter-level csa scan date.       Last office visit: Last office visit pertaining to requested medication was 4/16/19.

## 2021-06-06 NOTE — PROGRESS NOTES
[facility] Note  Landen Biswas   29 y.o. male    Date of Visit: 2/18/2020  Chief Complaint   Patient presents with     Erectile Dysfunction     X1 month       Assessment/Plan  1. Need for prophylactic vaccination and inoculation against influenza  - Influenza, Seasonal Quad, PF =/> 6months    2. Erectile dysfunction, unspecified erectile dysfunction type  3. Mixed hyperlipidemia  4. Elevated blood pressure reading without diagnosis of hypertension  Symptoms are more consistent with delayed ejaculation rather than anorgasmia.  Does not appear psychogenic in nature, however decrease in Lexapro dose may be beneficial.  In regards to the ED, unlikely secondary to Lexapro.  Inability to sustain an erection overlapped with notable history of elevated blood pressure/hypertension as well as mixed dyslipidemia, suggestive of a blood flow situation.  Hypogonadism very unlikely but will evaluate.  Unable to explain decline in weight outside of abstinence of alcohol resulting in overall decrease calories.  History suggestive of chronic prostatitis, which seems unusual for his age.  No thyromegaly on exam but tachycardia and unexplained weight loss may suggest thyroid abnormality.  Reviewed patient's elevated lipid markers and blood pressure from 2018 and in 2019, despite his weight loss.  Counseled to abstain from salty foods and added salt in his meals.  Also to limit caffeine intake.  He will return to clinic in 1 week for nursing check, when fasting, to check lipid panel, glucose and recheck blood pressure.  Depending on results, will decide what antihypertensive and statin to start patient on.   - Testosterone, Total; Future  - Lipid Cascade; Future  - Glucose; Future  - PSA (Prostatic-Specific Antigen), Diagnostic; Future  - Thyroid Stimulating Hormone (TSH); Future  - Lipid Cascade; Future       Much or all of the text in this note was generated through the use of Dragon Dictate voice-to-text  software. Errors in spelling or words which seem out of context are unintentional. Sound alike errors, in particular, may have escaped editing  Adrian Proctor MD    Return in about 1 week (around 2/25/2020), or if symptoms worsen or fail to improve, for BP check and for labs.    Subjective  This 29 y.o. old male with concerns about confusing ED issues. Takes lexapro daily. Denies stress in his life. Has been present for about 2 months. Happened a few years ago, and resolved on it's own. No recent change to lexapro dose (10 mg). Both issues qith ejaculation and with ejaculating. Cannot maintain an erection, even when by himself. No new medications. Wonders if his safe is related.  Endorses libido throughout adulthood, but no decreased body hair, gynecomastia and decreased muscle mass. Unsure if the size of his testes has decreased. Stopped cigarettes, and vapes roughly equivalent for 0.25 ppd. Smoked for over 10 years. Endorses an enlarged prostate in the past.  On 11/18 , HDL 39 and on 3/19/2019,  and .  Regarding the fast HR, Had 5 shots of espresso this morning. Runs on the treadmill and uses the elliptical. Stopped alcohol in June 2019. Unexplained 21 lb weight loss in 10 months, possibly 2/2 not drinking alcohol.    ROS A comprehensive review of systems was performed and was otherwise negative    Medications, allergies, and problem list were reviewed and updated    Exam  General appearance: Pleasant, nontoxic-appearing, no acute distress, alert and oriented x4  Vitals:    02/18/20 0928   BP: 132/78   Pulse: (!) 105   SpO2: 98%   NECK: Neck appearance was normal. There were no neck masses and the thyroid was not enlarged.  RESPIRATORY: Bilaterally with no crackles, wheezing or rhonchi. No gynecomastia  CARDIOVASCULAR: Tachycardic, regular S1 and S2.  Radial pulses intact.  No edema.  SKIN: no hypotrichosis on head  GASTROINTESTINAL: NABS, soft, nontender, nondistended  NEUROLOGIC:  Alert and oriented to person, place, time, and circumstance. Speech was normal.   GENITOURINARY: Patient declined evaluation  Additional Information   Current Outpatient Medications   Medication Sig Dispense Refill     ALPRAZolam (XANAX) 0.5 MG tablet TAKE 1 TABLET(0.5 MG) BY MOUTH 1 TIME AS NEEDED FOR SLEEP OR ANXIETY 30 tablet 0     escitalopram oxalate (LEXAPRO) 10 MG tablet Take 1 tablet (10 mg total) by mouth daily. 90 tablet 3     No current facility-administered medications for this visit.      No Known Allergies  Social History     Social History Narrative    Single. Domestic partner for 1 year. Homosexual. [employer] manager, [employer]. Used to be . Socially drinks alcohol. Smokes 1/2 pack of cigarettes a day.      Social History     Tobacco Use     Smoking status: Current Every Day Smoker     Packs/day: 0.50     Smokeless tobacco: Never Used     Tobacco comment: e-cig   Substance Use Topics     Alcohol use: Yes     Alcohol/week: 3.0 standard drinks     Types: 3 Shots of liquor per week     Frequency: Monthly or less     Drinks per session: 1 or 2     Binge frequency: Less than monthly     Drug use: Not on file     Family History   Problem Relation Age of Onset     COPD Maternal Grandmother      Time: total time spent with the patient was 30 minutes of which >50% was spent in counseling and coordination of care

## 2021-06-07 NOTE — TELEPHONE ENCOUNTER
Prescription Monitoring Program activity reviewed with no discrepancies noted.      Last fill per : 12/30/19  Quantity/days supply: 30    Controlled Substance Agreement on file: No  Date:     Last office visit with provider:  4/16/2019 Tank Clay MD    Please advise.  Cassy Pickering CMA ([facility])

## 2021-06-07 NOTE — TELEPHONE ENCOUNTER
Controlled Substance Refill Request  Medication Name:   Requested Prescriptions     Pending Prescriptions Disp Refills     ALPRAZolam (XANAX) 0.5 MG tablet [Pharmacy Med Name: ALPRAZOLAM 0.5MG TABLETS] 30 tablet 0     Sig: TAKE 1 TABLET(0.5 MG) BY MOUTH 1 TIME AS NEEDED FOR SLEEP OR ANXIETY     Date Last Fill: 12/30/19  [facility]ed [facility]  Submit electronically to pharmacy  Controlled Substance Agreement on file:   Encounter-Level CSA Scan Date:    There are no encounter-level csa scan date.        Last office visit:  2/18/20

## 2021-06-08 NOTE — TELEPHONE ENCOUNTER
.Prescription Monitoring Program activity reviewed with no discrepancies noted.      Last fill per : 3/26/20  Quantity/days supply: 30 tablets for 30 days    Controlled Substance Agreement on file: No  Date: NA    Last office visit with provider: 3/12/20    Please advise.

## 2021-06-08 NOTE — TELEPHONE ENCOUNTER
Controlled Substance Refill Request  Medication Name:   Requested Prescriptions     Pending Prescriptions Disp Refills     ALPRAZolam (XANAX) 0.5 MG tablet [Pharmacy Med Name: ALPRAZOLAM 0.5MG TABLETS] 30 tablet 0     Sig: TAKE 1 TABLET(0.5 MG) BY MOUTH 1 TIME AS NEEDED FOR SLEEP OR ANXIETY     Date Last Fill: 3/26/20  Requested [facility]acy: [facility]  Submit electronically to pharmacy  Controlled Substance Agreement on file:   Encounter-Level CSA Scan Date:    There are no encounter-level csa scan date.        Last office visit:  3/20/20

## 2021-06-11 NOTE — TELEPHONE ENCOUNTER
Controlled Substance Refill Request  Medication Name:   Requested Prescriptions     Pending Prescriptions Disp Refills     ALPRAZolam (XANAX) 0.5 MG tablet [Pharmacy Med Name: ALPRAZOLAM 0.5MG TABLETS] 30 tablet 0     Sig: TAKE 1 TABLET(0.5 MG) BY MOUTH 1 TIME AS NEEDED FOR SLEEP OR ANXIETY     Date Last Fill: 5/19/20  Requested [facility]: [facility]  Submit electronically to pharmacy  Controlled Substance Agreement on file:   Encounter-Level CSA Scan Date:    There are no encounter-level csa scan date.        Last office visit:  3/20/20

## 2021-06-16 NOTE — TELEPHONE ENCOUNTER
RN cannot approve Refill Request    RN can NOT refill this medication PCP messaged that patient is overdue for Office Visit. Last office visit: 4/16/2019 Tank Clay MD Last Physical: 3/19/2019 Last MTM visit: Visit date not found Last visit same specialty: 2/18/2020 Adrian Proctor MD.  Next visit within 3 mo: Visit date not found  Next physical within 3 mo: Visit date not found      Kiarra Castillo, [facility] Triage/Med Refill 4/2/2021    Requested Prescriptions   Pending Prescriptions Disp Refills     escitalopram oxalate (LEXAPRO) 10 MG tablet [Pharmacy Med Name: ESCITALOPRAM 10MG TABLETS] 90 tablet 3     Sig: TAKE 1 TABLET BY MOUTH EVERY DAY       [facility] Refill Protocol  Failed - 4/2/2021  3:18 AM        Failed - PCP or prescribing provider visit in last year     Last office visit with prescriber/PCP: 4/16/2019 Tank Clay MD OR same dept: Visit date not found OR same specialty: 2/18/2020 Adrian Proctor MD  Last physical: 3/19/2019 Last MTM visit: Visit date not found   Next visit within 3 mo: Visit date not found  Next physical within 3 mo: Visit date not found  Prescriber OR PCP: Tank Clay MD  Last diagnosis associated with med order: 1. Depressive disorder  - escitalopram oxalate (LEXAPRO) 10 MG tablet [Pharmacy Med Name: ESCITALOPRAM 10MG TABLETS]; TAKE 1 TABLET BY MOUTH EVERY DAY  Dispense: 90 tablet; Refill: 3    2. Anxiety  - escitalopram oxalate (LEXAPRO) 10 MG tablet [Pharmacy Med Name: ESCITALOPRAM 10MG TABLETS]; TAKE 1 TABLET BY MOUTH EVERY DAY  Dispense: 90 tablet; Refill: 3    If protocol passes may refill for 12 months if within 3 months of last provider visit (or a total of 15 months).

## 2021-06-17 NOTE — TELEPHONE ENCOUNTER
Please call patient -    ______________________________________________________________________     Home Phone:  [phone] (home)     Cell phone:   Telephone Information:   Mobile [phone]     ______________________________________________________________________     We did a 1 month refill of his escitalopram (Lexapro).    He is overdue to be see by Tank Clay MD for follow up of this issue.  Please help him to schedule a follow up.    Nino Linares MD  [facility]  5/3/2021, 12:44 PM

## 2021-06-17 NOTE — TELEPHONE ENCOUNTER
RN cannot approve Refill Request    RN can NOT refill this medication Protocol failed and NO refill given. Last office visit: 4/16/2019 Tank Clay MD Last Physical: 3/19/2019 Last MTM visit: Visit date not found Last visit same specialty: 2/18/2020 Adrian Proctor MD.  Next visit within 3 mo: Visit date not found  Next physical within 3 mo: Visit date not found      Landen Ball, [facility] Triage/Med Refill 5/3/2021    Requested Prescriptions   Pending Prescriptions Disp Refills     escitalopram oxalate (LEXAPRO) 10 MG tablet [Pharmacy Med Name: ESCITALOPRAM 10MG TABLETS] 30 tablet 0     Sig: TAKE 1 TABLET BY MOUTH EVERY DAY       [facility] Refill Protocol  Failed - 5/3/2021  3:23 AM        Failed - PCP or prescribing provider visit in last year     Last office visit with prescriber/PCP: 4/16/2019 Tank Clay MD OR same dept: Visit date not found OR same specialty: 2/18/2020 Adrian Proctor MD  Last physical: 3/19/2019 Last MTM visit: Visit date not found   Next visit within 3 mo: Visit date not found  Next physical within 3 mo: Visit date not found  Prescriber OR PCP: Tank Clay MD  Last diagnosis associated with med order: 1. Depressive disorder  - escitalopram oxalate (LEXAPRO) 10 MG tablet [Pharmacy Med Name: ESCITALOPRAM 10MG TABLETS]; TAKE 1 TABLET BY MOUTH EVERY DAY  Dispense: 30 tablet; Refill: 0    2. Anxiety  - escitalopram oxalate (LEXAPRO) 10 MG tablet [Pharmacy Med Name: ESCITALOPRAM 10MG TABLETS]; TAKE 1 TABLET BY MOUTH EVERY DAY  Dispense: 30 tablet; Refill: 0    If protocol passes may refill for 12 months if within 3 months of last provider visit (or a total of 15 months).

## 2021-06-17 NOTE — TELEPHONE ENCOUNTER
Called and lvm for patient stating refill was sent and patient is over due to be seen. Will need to schedule an appt for further refills.    Please assist with scheduling

## 2021-06-18 NOTE — PATIENT INSTRUCTIONS - HE
Patient Instructions by Adrian Proctor MD at 2/18/2020  9:40 AM     Author: Adrian Proctor MD Service: -- Author Type: Physician    Filed: 2/18/2020 10:16 AM Encounter Date: 2/18/2020 Status: Addendum    : Adrian Proctor MD (Physician)    Related Notes: Original Note by Adrian Proctor MD (Physician) filed at 2/18/2020 10:02 AM       Patient Education     Evaluating Erectile Dysfunction     Doctor talking to man.   Many men feel embarrassed to talk to a healthcare provider about erectile dysfunction (ED). This common problem can be treated, but only if your provider knows about it. Your provider will likely ask you questions about your ED. Whether youre asked or not, tell your provider anything that might help him or her understand the problem. Your provider may do an exam and may run some tests to help find the cause of your ED.  A simple exam  A medical exam may help your healthcare provider understand what is causing your problem. ED is sometimes the first sign of some other health problem, so your provider may check your overall health. He or she may also examine your penis, scrotum, and testicles. Tell your provider about all of the medicines you take, including prescribed and over-the-counter medicines, as well as any herbs or supplements.  You may have some tests  Your healthcare provider may recommend some or all of these tests:    Blood tests measure your levels of hormones or lipids (fatty substances in the blood, including cholesterol). Other tests check for diabetes or help show the health of your liver, kidneys, and prostate.    Blood flow tests check how well blood moves through your penis.    A rectal exam checks for an enlarged prostate gland. An enlarged prostate and ED have been linked in recent studies.    Other tests check for other conditions that limit your ability to have sex.  Your treatment plan  Based on what you say and what any exam  shows, your healthcare provider will recommend a treatment plan. The first step may be to try ED medicines, since they help most men. If they dont help you, your provider can suggest other kinds of treatment. You and your partner may also want to discuss which options would work best in your relationship. Treatment may include addressing the cause of health problems, such as lowering your cholesterol. And counseling may be recommended to talk about underlying emotional issues.    Controlling High Blood Pressure  High blood pressure (hypertension) is often called the silent killer. This is because many people who have it, dont know it. It can be very dangerous High blood pressure can raise your risk of heart attack, stroke, heart disease, and heart failure. Controlling your blood pressure can decrease your risk of these problems. It's important to know the appropriate blood pressure range and remember to check your blood pressure regularly. Doing so can save your life.  Blood pressure measurements are given as 2 numbers. Systolic blood pressure is the upper number. This is the pressure when the heart contracts. Diastolic blood pressure is the lower number. This is the pressure when the heart relaxes between beats.  Blood pressure is categorized as normal, elevated, or stage 1 or stage 2 high blood pressure:    Normal blood pressure is systolic of less than 120 and diastolic of less than 80 (120/80)    Elevated blood pressure is systolic of 120 to 129 and diastolic less than 80    Stage 1 high blood pressure is systolic is 130 to 139 or diastolic between 80 to 89    Stage 2 high blood pressure is when systolic is 140 or higher or the diastolic is 90 or higher  A heart-healthy lifestyle can help you control your blood pressure without medicines. Here are some things you can do to pursue a heart-healthy lifestyle:  Choose heart-healthy foods    Select low-salt, low-fat foods. Limit sodium intake to 2,400 mg per day or  the amount suggested by your healthcare provider.    Limit canned, dried, cured, packaged, and fast foods. These can contain a lot of salt.    Eat 8 to 10 servings of fruits and vegetables every day.    Choose lean meats, fish, or chicken.    Eat whole-grain pasta, brown rice, and beans.    Eat 2 to 3 servings of low-fat or fat-free dairy products.    Ask your doctor about the DASH eating plan. This plan helps reduce blood pressure.    When you go to a restaurant, ask that your meal be prepared with no added salt.    Stay at a healthy weight    Ask your healthcare provider how many calories to eat a day. Then stick to that number.    Ask your healthcare provider what weight range is healthiest for you. If you are overweight, a weight loss of only 3% to 5% of your body weight can help lower blood pressure. Generally, a good weight loss goal is to lose 10% of your body weight in a year.    Limit snacks and sweets.    Get regular exercise.    Get up and get active    Find activities you enjoy that can be done alone or with friends or family. Such activities might include bicycling, dancing, walking, or jogging.    Park farther away from building entrances to walk more.    Use stairs instead of the elevator.    When you can, walk or bike instead of driving.    Taylor leaves, garden, or do household repairs.    Be active at a moderate to vigorous level of physical activity for at least 40 minutes for a minimum of 3 to 4 days a week.     Manage stress    Make time to relax and enjoy life. Find time to laugh.    Communicate your concerns with your loved ones and your healthcare provider.    Visit with family and friends, and keep up with hobbies.    Limit alcohol and quit smoking    Men should have no more than 2 drinks per day.    Women should have no more than 1 drink per day.    Talk with your healthcare provider about quitting smoking. Smoking significantly increases your risk for heart disease and stroke. Ask your  healthcare provider about community smoking cessation programs and other options.  Medicines  If lifestyle changes arent enough, your healthcare provider may prescribe high blood pressure medicine. Take all medicines as prescribed. If you have any questions about your medicines, ask your healthcare provider before stopping or changing them.

## 2021-06-19 NOTE — LETTER
Letter by Tank Clay MD at      Author: Tank Clay MD Service: -- Author Type: --    Filed:  Encounter Date: 3/20/2019 Status: (Other)         Landen Biswas  [address]  [id number]  [place] [address]             March 20, 2019         Dear Mr. Biswas,    Below are the results from your recent visit:    Resulted Orders   Lipid Cascade   Result Value Ref Range    Cholesterol 220 (H) <=199 mg/dL    Triglycerides 179 (H) <=149 mg/dL    HDL Cholesterol 37 (L) >=40 mg/dL    LDL Calculated 147 (H) <=129 mg/dL    Patient Fasting > 8hrs? Yes    Comprehensive Metabolic Panel   Result Value Ref Range    Sodium 139 136 - 145 mmol/L    Potassium 4.1 3.5 - 5.0 mmol/L    Chloride 104 98 - 107 mmol/L    CO2 28 22 - 31 mmol/L    Anion Gap, Calculation 7 5 - 18 mmol/L    Glucose 85 70 - 125 mg/dL    BUN 10 8 - 22 mg/dL    Creatinine 0.89 0.70 - 1.30 mg/dL    GFR MDRD Af Amer >60 >60 mL/min/1.73m2    GFR MDRD Non Af Amer >60 >60 mL/min/1.73m2    Bilirubin, Total 1.3 (H) 0.0 - 1.0 mg/dL    Calcium 9.9 8.5 - 10.5 mg/dL    Protein, Total 7.1 6.0 - 8.0 g/dL    Albumin 4.3 3.5 - 5.0 g/dL    Alkaline Phosphatase 70 45 - 120 U/L    AST 39 0 - 40 U/L    ALT 60 (H) 0 - 45 U/L    Narrative    Fasting Glucose reference range is 70-99 mg/dL per  American Diabetes Association (ADA) guidelines.   HM2(CBC w/o Differential)   Result Value Ref Range    WBC 5.6 4.0 - 11.0 thou/uL    RBC 4.89 4.40 - 6.20 mill/uL    Hemoglobin 15.4 14.0 - 18.0 g/dL    Hematocrit 44.2 40.0 - 54.0 %    MCV 90 80 - 100 fL    MCH 31.5 27.0 - 34.0 pg    MCHC 34.8 32.0 - 36.0 g/dL    RDW 11.8 11.0 - 14.5 %    Platelets 191 140 - 440 thou/uL    MPV 8.5 7.0 - 10.0 fL   Urinalysis-UC if Indicated   Result Value Ref Range    Color, UA Yellow Colorless, Yellow, Straw, Light Yellow    Clarity, UA Clear Clear    Glucose, UA Negative Negative    Bilirubin, UA Negative Negative    Ketones, UA Negative Negative    Specific Gravity, UA 1.015 1.005 - 1.030    Blood, UA  Trace (!) Negative    pH, UA 8.5 (H) 5.0 - 8.0    Protein, UA Negative Negative mg/dL    Urobilinogen, UA 1.0 E.U./dL 0.2 E.U./dL, 1.0 E.U./dL    Nitrite, UA Negative Negative    Leukocytes, UA Negative Negative    Bacteria, UA None Seen None Seen hpf    RBC, UA 0-2 None Seen, 0-2 hpf    WBC, UA None Seen None Seen, 0-5 hpf    Squam Epithel, UA None Seen None Seen, 0-5 lpf    Narrative    UC not indicated       You have increased lipids specifically LDL, the bad cholesterol and total cholesterol.  You do not need medication yet.  But you need avoid fatty or high cholesterol diet.  Will repeat these next visit in 3 months.    Rest of your labs are normal with some insignificant changes of no clinical importance.  Thank you.    Please call with questions or contact us using [employer].    Sincerely,        Electronically signed by Tank Clay MD

## 2021-06-21 NOTE — LETTER
Letter by Tank Clay MD at      Author: Tank Clay MD Service: -- Author Type: --    Filed:  Encounter Date: 5/3/2021 Status: (Other)       Landen Biswas  [address]  [address]  [place] [address]      05/04/21      Dear Landen,      In reviewing your records, Tank Clay MD has determined an appointment is needed please call the clinic to schedule a:      Medication Check with in 30 days.        Please call [phone], press option 2 to speak to clinical staff.  A 30 day supply of medication was sent in on 05/03/2021.  We can no longer fill medication until you are seen.      We believe that a strong preventative care program, including regular physicals and follow-up care is an important part of a healthy lifestyle and we are committed to helping you maintain your health.    Thank you for choosing us as your health care provider.    Sincerely,    Rosanna Taylor CMA - CMT/CA  Tank Clay MD Care Team  [facility]  [address]   [place] [address]  [phone]

## 2021-06-25 NOTE — TELEPHONE ENCOUNTER
RN cannot approve Refill Request    RN can NOT refill this medication PCP messaged that patient is overdue for Office Visit and Protocol failed and NO refill given. Last office visit: Visit date not found Last Physical: Visit date not found Last MTM visit: Visit date not found Last visit same specialty: 2/18/2020 Adrian Proctor MD.  Next visit within 3 mo: Visit date not found  Next physical within 3 mo: Visit date not found      Cecy Holt, [facility] Triage/Med Refill 6/1/2021    Requested Prescriptions   Pending Prescriptions Disp Refills     escitalopram oxalate (LEXAPRO) 10 MG tablet [Pharmacy Med Name: ESCITALOPRAM 10MG TABLETS] 30 tablet 0     Sig: TAKE 1 TABLET(10 MG) BY MOUTH DAILY       [facility] Refill Protocol  Failed - 6/1/2021  3:21 AM        Failed - PCP or prescribing provider visit in last year     Last office visit with prescriber/PCP: Visit date not found OR same dept: Visit date not found OR same specialty: 2/18/2020 Adrian Proctor MD  Last physical: Visit date not found Last MTM visit: Visit date not found   Next visit within 3 mo: Visit date not found  Next physical within 3 mo: Visit date not found  Prescriber OR PCP: Nino Linares MD  Last diagnosis associated with med order: 1. Depressive disorder  - escitalopram oxalate (LEXAPRO) 10 MG tablet [Pharmacy Med Name: ESCITALOPRAM 10MG TABLETS]; TAKE 1 TABLET(10 MG) BY MOUTH DAILY  Dispense: 30 tablet; Refill: 0    2. Anxiety  - escitalopram oxalate (LEXAPRO) 10 MG tablet [Pharmacy Med Name: ESCITALOPRAM 10MG TABLETS]; TAKE 1 TABLET(10 MG) BY MOUTH DAILY  Dispense: 30 tablet; Refill: 0    If protocol passes may refill for 12 months if within 3 months of last provider visit (or a total of 15 months).

## 2021-06-25 NOTE — PROGRESS NOTES
Office Visit - New Patient Physical   Landen Biswas   28 y.o.  male    Date of visit: 3/19/2019  Physician: Tank Clay MD     Assessment and Plan   1. Routine general medical examination at a health care facility  Informed and advised his compressive physical exam is normal.  - Lipid Cascade  - Comprehensive Metabolic Panel  - HM2(CBC w/o Differential)  - Urinalysis-UC if Indicated    2. Mild episode of recurrent major depressive disorder (H)  Controlled.  Continue Lexapro.  PHQ 9 score is 10 which is quite good.    3. Anxiety  Controlled.  Continue Lexapro and alprazolam as needed.  CIERRA score is only 6.    4. Prehypertension  Shows increased blood pressure of 134/90 on initial check but on repeat check was 130/80.  May have prehypertension because in the past he was also told  he  had borderline blood pressure.    FOLLOWUP in 3 months for his blood pressure.     Chief Complaint   Establish Care (moved here from [place] in sept and working at [employer], has male partner- no kids, lives [place]) and Annual Exam (had PCP in [place]-will get records, fasting, needs refill on Xanax and Lexapro)       Patient Profile   Social History     Social History Narrative    Single. Domestic partner for 1 year. Homosexual. [employer], [employer]. Used to be . Socially drinks alcohol. Smokes 1/2 pack of cigarettes a day.         Past Medical History   Patient Active Problem List   Diagnosis     Anxiety     Depressive disorder       Past Surgical History  He has no past surgical history on file.     History of Present Illness   This 28 y.o. old male is here for the first time to have a comprehensive physical exam.  Has depression and anxiety controlled by his medications daily Lexapro and as needed alprazolam.  Wears contacts.  Does not see without his contacts.  Had his eye exam in December which was apparently normal.  Hears well.  Denies swallowing problems and heartburn.  Denies chest pain or shortness of  "breath.  Does not  have regular exercise.  But physically active at work.  Denies urinary and bowel symptoms.  Sleeps well.  Denies aches and pains.  Sexually active.    Review of Systems   A 12 point comprehensive review of systems was negative except as noted.         Medications and Allergies   Current Outpatient Medications   Medication Sig     ALPRAZolam (XANAX) 0.125 mg Take 0.5 mg by mouth at bedtime as needed.            escitalopram oxalate (LEXAPRO) 10 MG tablet Take 10 mg by mouth daily.            Allergies no known allergies     Family and Social History   No family history on file.     Social History     Tobacco Use     Smoking status: Current Every Day Smoker     Packs/day: 0.50     Types: Cigarettes     Smokeless tobacco: Never Used   Substance Use Topics     Alcohol use: Yes     Alcohol/week: 1.8 oz     Types: 3 Shots of liquor per week     Frequency: Monthly or less     Drinks per session: 1 or 2     Binge frequency: Less than monthly     Drug use: Not on file        Physical Exam   Physical Exam  /80   Pulse (!) 104   Ht 6' 2.5\" (1.892 m)   Wt 190 lb (86.2 kg)   SpO2 98%   BMI 24.07 kg/m      General Appearance:    Alert, cooperative, no distress, appears stated age, pleasant   Head:    Normocephalic, without obvious abnormality, atraumatic   Eyes:    PERRL, conjunctiva/corneas clear, EOM's intact, fundi     benign, both eyes        Ears:    Normal TM's and external ear canals, both ears   Nose:   Nares normal, septum midline, mucosa normal, no drainage    or sinus tenderness   Throat:   Lips, mucosa, and tongue normal; teeth and gums normal   Neck:   Supple, symmetrical, trachea midline, no adenopathy;        thyroid:  No enlargement/tenderness/nodules; no carotid    bruit or JVD   Back:     Symmetric, no curvature, ROM normal, no CVA tenderness   Lungs:     Clear to auscultation bilaterally, respirations unlabored   Chest wall:    No tenderness or deformity   Heart:    Regular rate " and rhythm, S1 and S2 normal, no murmur, rub   or gallop   Abdomen:     Soft, non-tender, bowel sounds active all four quadrants,     no masses, no organomegaly   Genitalia:    Normal male without lesion, discharge or tenderness,    circumcised   Rectal:    Deferred, has normal  perianal exam   Extremities:   Extremities normal, atraumatic, no cyanosis or edema   Pulses:   2+ and symmetric all extremities   Skin:   Skin color, texture, turgor normal, no rashes or lesions   Lymph nodes:   Cervical, supraclavicular, and axillary nodes normal   Neurologic:   CNII-XII intact. Normal strength, sensation and reflexes       throughout        Additional Information        Tank Clay MD  Internal Medicine  Contact me at None

## 2021-06-25 NOTE — TELEPHONE ENCOUNTER
Left a message to call back regarding recent lab results and recommendations. Please relay results and recommendations to patient below;\    You have increased lipids specifically LDL, the bad cholesterol and total cholesterol.  You do not need medication yet.  But you need avoid fatty or high cholesterol diet.  Will repeat these next visit in 3 months.  Rest of your labs are normal with some insignificant changes of no clinical importance.  Thank you.      Ramya Bains LPN

## 2021-06-28 NOTE — PROGRESS NOTES
"Progress Notes by Adrian Proctor MD at 3/20/2020  2:00 PM     Author: Adrian Proctor MD Service: -- Author Type: Physician    Filed: 3/20/2020  2:55 PM Encounter Date: 3/20/2020 Status: Signed    : Adrian Proctor MD (Physician)       Landen Biswas is a 29 y.o. male who is being evaluated via a billable telephone visit.      The patient has been notified of following:     \"This telephone visit will be conducted via a call between you and your physician/provider. We have found that certain health care needs can be provided without the need for a physical exam.  This service lets us provide the care you need with a short phone conversation.  If a prescription is necessary we can send it directly to your pharmacy.  If lab work is needed we can place an order for that and you can then stop by our lab to have the test done at a later time.    If during the course of the call the physician/provider feels a telephone visit is not appropriate, you will not be charged for this service.\"     Landen Biswas complains of    Chief Complaint   Patient presents with   ? Rash     RT side of cheek, temple, and ear - pain started Tuesday - rash started today     I have reviewed and updated the patient's Past Medical History, Social History, Family History and Medication List.    ALLERGIES  Patient has no known allergies.    Additional provider notes: States that he is not doing great. Thinks he is suffering shingles. States that life has been stressful. States that he has had a shingles rash, and almost looks like readness. At first he thought it was a tooth issue. Started on Tuesday. Became more painful on Wednesday - oragel and ibuprofen was not helping. Pain is sharp, knife like and pulsating from right temple down the jaw line, right cheek. Flares up every 30 minutes. Rash is not raised and not vesicular. Rash started this am. There is a heat resonating from it. Left side of face affected, no " eye pain/blurriness/double vision.  Labs were CMP, CBC from 3/19/2019 that was unremarkable except for ALT of 60, HDL 37 and LDL of 147.  Not a diabetic.  [facility]      Assessment/Plan:  1. Herpes zoster without complication  Based on rash and patient account, no obvious vesicular pattern at the moment.  Does not cross midline and seems to be following the V3/mandibular dermatome.  Counseled patient regarding differential including cellulitis, TMJ, parotiditis.  Unlikely secondary to temporal arteritis/GCA. No f/s/c. Counseled him to pay close attention to his rash and symptoms and to let us know/seek medical assistance being the weekend if things worsen to an unbearable level.  Will use acetaminophen and as needed ibuprofen/NSAIDs for his pain.  - acyclovir (ZOVIRAX) 800 MG tablet; Take 1 tablet (800 mg total) by mouth 5 (five) times a day for 7 days.  Dispense: 35 tablet; Refill: 0    Phone call duration:  9 minutes  3:27 PM-2:36 PM    Kianna Slaughter LPN

## 2021-07-01 ENCOUNTER — RECORDS - HEALTHEAST (OUTPATIENT)
Dept: INTERNAL MEDICINE | Facility: CLINIC | Age: 31
End: 2021-07-01

## 2021-07-04 NOTE — TELEPHONE ENCOUNTER
Telephone Encounter by Talia Arora RN at 7/1/2021  7:45 AM     Author: Talia Arora RN Service: -- Author Type: Registered Nurse    Filed: 7/1/2021  7:45 AM Encounter Date: 7/1/2021 Status: Signed    : Talia Arora RN (Registered Nurse)       RN cannot approve Refill Request    RN can NOT refill this medication PCP messaged that patient is overdue for Office Visit. Last office visit: 4/16/2019 Tank Clay MD Last Physical: 3/19/2019 Last MTM visit: Visit date not found Last visit same specialty: 2/18/2020 Adrian Proctor MD.  Next visit within 3 mo: Visit date not found  Next physical within 3 mo: Visit date not found      Luis Franco Connection Triage/Med Refill 7/1/2021    Requested Prescriptions   Pending Prescriptions Disp Refills   ? escitalopram oxalate (LEXAPRO) 10 MG tablet [Pharmacy Med Name: ESCITALOPRAM 10MG TABLETS] 30 tablet 0     Sig: TAKE 1 TABLET(10 MG) BY MOUTH DAILY       SSRI Refill Protocol  Failed - 7/1/2021  3:24 AM        Failed - PCP or prescribing provider visit in last year     Last office visit with prescriber/PCP: 4/16/2019 Tank Clay MD OR same dept: Visit date not found OR same specialty: 2/18/2020 Adrian Proctor MD  Last physical: 3/19/2019 Last MTM visit: Visit date not found   Next visit within 3 mo: Visit date not found  Next physical within 3 mo: Visit date not found  Prescriber OR PCP: Tank Clay MD  Last diagnosis associated with med order: 1. Depressive disorder  - escitalopram oxalate (LEXAPRO) 10 MG tablet [Pharmacy Med Name: ESCITALOPRAM 10MG TABLETS]; TAKE 1 TABLET(10 MG) BY MOUTH DAILY  Dispense: 30 tablet; Refill: 0    2. Anxiety  - escitalopram oxalate (LEXAPRO) 10 MG tablet [Pharmacy Med Name: ESCITALOPRAM 10MG TABLETS]; TAKE 1 TABLET(10 MG) BY MOUTH DAILY  Dispense: 30 tablet; Refill: 0    If protocol passes may refill for 12 months if within 3 months of last provider  visit (or a total of 15 months).

## 2021-08-05 DIAGNOSIS — F32.A DEPRESSIVE DISORDER: ICD-10-CM

## 2021-08-05 DIAGNOSIS — F41.9 ANXIETY: ICD-10-CM

## 2021-08-09 RX ORDER — ESCITALOPRAM OXALATE 10 MG/1
TABLET ORAL
Qty: 30 TABLET | Refills: 0 | Status: SHIPPED | OUTPATIENT
Start: 2021-08-09 | End: 2022-01-04

## 2021-08-09 NOTE — TELEPHONE ENCOUNTER
"Routing refill request to provider for review/approval because:  selective serotonin reuptake inhibitor protocol failed    Last Written Prescription Date:  7/1/2021  Last Fill Quantity: 30,  # refills: 0   Last office visit provider:  3/20/2020 Dr. Proctor     escitalopram oxalate (LEXAPRO) 10 MG tablet 30 tablet 0 7/1/2021  No   Sig: TAKE 1 TABLET(10 MG) BY MOUTH DAILY   Sent to pharmacy as: escitalopram 10 mg tablet (LEXAPRO)   Notes to Pharmacy: Very overdue for office visit. Last refill. Notify patient.   E-Prescribing Status: Receipt confirmed by pharmacy (7/1/2021  8:39 AM CDT         Requested Prescriptions   Pending Prescriptions Disp Refills     escitalopram (LEXAPRO) 10 MG tablet [Pharmacy Med Name: ESCITALOPRAM 10MG TABLETS] 30 tablet 0     Sig: TAKE 1 TABLET(10 MG) BY MOUTH DAILY       SSRIs Protocol Failed - 8/5/2021  8:07 AM        Failed - PHQ-9 score less than 5 in past 6 months     Please review last PHQ-9 score.           Failed - Recent (6 mo) or future (30 days) visit within the authorizing provider's specialty     Patient had office visit in the last 6 months or has a visit in the next 30 days with authorizing provider or within the authorizing provider's specialty.  See \"Patient Info\" tab in inbasket, or \"Choose Columns\" in Meds & Orders section of the refill encounter.            Passed - Medication is active on med list        Passed - Patient is age 18 or older             Marly Alejo RN 08/08/21 10:48 PM  "

## 2021-09-27 DIAGNOSIS — F32.A DEPRESSIVE DISORDER: ICD-10-CM

## 2021-09-27 DIAGNOSIS — F41.9 ANXIETY: ICD-10-CM

## 2021-09-27 RX ORDER — ESCITALOPRAM OXALATE 10 MG/1
TABLET ORAL
Qty: 30 TABLET | Refills: 0 | OUTPATIENT
Start: 2021-09-27

## 2021-09-27 NOTE — TELEPHONE ENCOUNTER
"Routing refill request to provider for review/approval because:  PHQ 9 score  Patient needs to be seen because it has been more than 1 year since last office visit.    Last Written Prescription Date:  8/9/21  Last Fill Quantity: 30,  # refills: 0   Last office visit provider:  3/20/20     Requested Prescriptions   Pending Prescriptions Disp Refills     escitalopram (LEXAPRO) 10 MG tablet [Pharmacy Med Name: ESCITALOPRAM 10MG TABLETS] 30 tablet 0     Sig: TAKE 1 TABLET(10 MG) BY MOUTH DAILY       SSRIs Protocol Failed - 9/27/2021  3:18 AM        Failed - PHQ-9 score less than 5 in past 6 months     Please review last PHQ-9 score.           Failed - Recent (6 mo) or future (30 days) visit within the authorizing provider's specialty     Patient had office visit in the last 6 months or has a visit in the next 30 days with authorizing provider or within the authorizing provider's specialty.  See \"Patient Info\" tab in inbasket, or \"Choose Columns\" in Meds & Orders section of the refill encounter.            Passed - Medication is active on med list        Passed - Patient is age 18 or older             Landen Ball RN 09/27/21 2:28 PM  "

## 2021-09-28 NOTE — TELEPHONE ENCOUNTER
Left message for patient to call back. When he calls back please relay message below and assist as needed

## 2022-01-04 DIAGNOSIS — F32.A DEPRESSIVE DISORDER: ICD-10-CM

## 2022-01-04 DIAGNOSIS — F41.9 ANXIETY: ICD-10-CM

## 2022-01-04 RX ORDER — ESCITALOPRAM OXALATE 10 MG/1
10 TABLET ORAL DAILY
Qty: 30 TABLET | Refills: 0 | Status: SHIPPED | OUTPATIENT
Start: 2022-01-04 | End: 2022-01-17

## 2022-01-04 NOTE — TELEPHONE ENCOUNTER
Pt had appt today to Establish Care but had to be rescheduled as provider was out of office.     Pt was at [facility] and advised to call [facility] for refill of Escitalopram 10 mg tablet.    Pt Establish Care appt with Franny Quiles is now scheduled for 01/17/22.    [facility] on [facility] is the preferred pharmacy.

## 2022-01-17 ENCOUNTER — OFFICE VISIT (OUTPATIENT)
Dept: FAMILY MEDICINE | Facility: CLINIC | Age: 32
End: 2022-01-17
Payer: COMMERCIAL

## 2022-01-17 VITALS
HEART RATE: 81 BPM | OXYGEN SATURATION: 99 % | HEIGHT: 74 IN | BODY MASS INDEX: 24.85 KG/M2 | WEIGHT: 193.6 LBS | SYSTOLIC BLOOD PRESSURE: 132 MMHG | DIASTOLIC BLOOD PRESSURE: 68 MMHG

## 2022-01-17 DIAGNOSIS — Z00.00 ROUTINE GENERAL MEDICAL EXAMINATION AT A HEALTH CARE FACILITY: Primary | ICD-10-CM

## 2022-01-17 DIAGNOSIS — J34.89 NASAL SORE: ICD-10-CM

## 2022-01-17 DIAGNOSIS — E78.5 DYSLIPIDEMIA: ICD-10-CM

## 2022-01-17 DIAGNOSIS — F17.200 NICOTINE DEPENDENCE, UNCOMPLICATED, UNSPECIFIED NICOTINE PRODUCT TYPE: ICD-10-CM

## 2022-01-17 DIAGNOSIS — F33.0 MILD EPISODE OF RECURRENT MAJOR DEPRESSIVE DISORDER (H): ICD-10-CM

## 2022-01-17 DIAGNOSIS — F41.9 ANXIETY: ICD-10-CM

## 2022-01-17 PROCEDURE — 99214 OFFICE O/P EST MOD 30 MIN: CPT | Mod: 25 | Performed by: NURSE PRACTITIONER

## 2022-01-17 PROCEDURE — 96127 BRIEF EMOTIONAL/BEHAV ASSMT: CPT | Mod: 59 | Performed by: NURSE PRACTITIONER

## 2022-01-17 PROCEDURE — 90471 IMMUNIZATION ADMIN: CPT | Performed by: NURSE PRACTITIONER

## 2022-01-17 PROCEDURE — 99395 PREV VISIT EST AGE 18-39: CPT | Mod: 25 | Performed by: NURSE PRACTITIONER

## 2022-01-17 PROCEDURE — 90715 TDAP VACCINE 7 YRS/> IM: CPT | Performed by: NURSE PRACTITIONER

## 2022-01-17 RX ORDER — MUPIROCIN 20 MG/G
OINTMENT TOPICAL 3 TIMES DAILY
Qty: 30 G | Refills: 1 | Status: SHIPPED | OUTPATIENT
Start: 2022-01-17 | End: 2023-08-30

## 2022-01-17 RX ORDER — ESCITALOPRAM OXALATE 10 MG/1
10 TABLET ORAL DAILY
Qty: 90 TABLET | Refills: 3 | Status: SHIPPED | OUTPATIENT
Start: 2022-01-17 | End: 2022-06-02

## 2022-01-17 RX ORDER — ALPRAZOLAM 0.5 MG
0.5 TABLET ORAL DAILY PRN
Qty: 30 TABLET | Refills: 0 | Status: SHIPPED | OUTPATIENT
Start: 2022-01-17 | End: 2022-08-27

## 2022-01-17 ASSESSMENT — PATIENT HEALTH QUESTIONNAIRE - PHQ9: SUM OF ALL RESPONSES TO PHQ QUESTIONS 1-9: 2

## 2022-01-17 ASSESSMENT — MIFFLIN-ST. JEOR: SCORE: 1901.29

## 2022-01-17 NOTE — PROGRESS NOTES
SUBJECTIVE:   CC: Landen Biswas is an 31 year old male who presents for preventative health visit.     New patient, has seen Dr. Clay in the past. History notable for depression, anxiety, otherwise negative.  Works as a  for [employer].     Depression/anxiety:  Lexapro 10mg daily.  Xanax 1-2 times a week.  Use with acute anxiety and flying.  Feels anxiety has been well controlled for many years on Lexapro.  Also sober over two and a half years which he feels was really the turning point with control of his mental health.      Sore in left nare.  Recurrent scabbing and bleeding.      Hyperlipidemia:  FMH in father.  Has been in mild/moderate range.  Very physical job. In the summer runs.  In the winter yoga    HM:  Due Tdap, up to date on HPV, COVID, flu    Patient has been advised of split billing requirements and indicates understanding: Yes  Answers for HPI/ROS submitted by the patient on 1/17/2022  Frequency of exercise:: 1 day/week  Getting at least 3 servings of Calcium per day:: NO  Diet:: Regular (no restrictions)  Taking medications regularly:: Yes  Medication side effects:: None  Bi-annual eye exam:: Yes  Dental care twice a year:: Yes  Sleep apnea or symptoms of sleep apnea:: Daytime drowsiness  Additional concerns today:: Yes  Duration of exercise:: 15-30 minutes          Today's PHQ-2 Score:   PHQ-2 ( 1999 Pfizer) 1/17/2022   Q1: Little interest or pleasure in doing things 1   Q2: Feeling down, depressed or hopeless 1   PHQ-2 Score 2   PHQ-2 Total Score (12-17 Years)- Positive if 3 or more points; Administer PHQ-A if positive -   Q1: Little interest or pleasure in doing things Several days   Q2: Feeling down, depressed or hopeless Several days   PHQ-2 Score 2       Abuse: Current or Past(Physical, Sexual or Emotional)- No  Do you feel safe in your environment? Yes      Social History     Tobacco Use     Smoking status: Current Every Day Smoker     Packs/day: 0.50     Years: 8.00     " Pack years: 4.00     Smokeless tobacco: Never Used     Tobacco comment: e-cig   Substance Use Topics     Alcohol use: Not Currently         Alcohol Use 1/17/2022   Prescreen: >3 drinks/day or >7 drinks/week? Not Applicable       Last PSA: No results found for: PSA    Reviewed orders with patient. Reviewed health maintenance and updated orders accordingly - Yes      Reviewed and updated as needed this visit by clinical staff  Tobacco  Allergies  Meds             Reviewed and updated as needed this visit by Provider  Tobacco                  Review of Systems  CONSTITUTIONAL: NEGATIVE for fever, chills, change in weight  INTEGUMENTARY/SKIN: NEGATIVE for worrisome rashes, moles or lesions  EYES: NEGATIVE for vision changes or irritation  ENT: NEGATIVE for ear, mouth and throat problems  RESP: NEGATIVE for significant cough or SOB  CV: NEGATIVE for chest pain, palpitations or peripheral edema  GI: NEGATIVE for nausea, abdominal pain, heartburn, or change in bowel habits   male: negative for dysuria, hematuria, decreased urinary stream, erectile dysfunction, urethral discharge  MUSCULOSKELETAL: NEGATIVE for significant arthralgias or myalgia  NEURO: NEGATIVE for weakness, dizziness or paresthesias  PSYCHIATRIC: NEGATIVE for changes in mood or affect    OBJECTIVE:   /68 (BP Location: Left arm, Patient Position: Sitting, Cuff Size: Adult Regular)   Pulse 81   Ht 1.885 m (6' 2.21\")   Wt 87.8 kg (193 lb 9.6 oz)   SpO2 99%   BMI 24.71 kg/m      Physical Exam  GENERAL: healthy, alert and no distress  EYES: Eyes grossly normal to inspection, PERRL and conjunctivae and sclerae normal  HENT: normal cephalic/atraumatic, ear canals and TM's normal, oropharynx clear.  Left nare with ulcerated lesion on septal side, scant bleeding.   NECK: no adenopathy, no asymmetry, masses, or scars and thyroid normal to palpation  RESP: lungs clear to auscultation - no rales, rhonchi or wheezes  CV: regular rate and rhythm, " normal S1 S2, no S3 or S4, no murmur, click or rub, no peripheral edema and peripheral pulses strong  ABDOMEN: soft, nontender, no hepatosplenomegaly, no masses and bowel sounds normal  MS: no gross musculoskeletal defects noted, no edema  SKIN: no suspicious lesions or rashes  NEURO: Normal strength and tone, mentation intact and speech normal  PSYCH: mentation appears normal, affect normal/bright    Diagnostic Test Results:  Labs reviewed in Epic    ASSESSMENT/PLAN:   1. Routine general medical examination at a health care facility  Tdap today. HM otherwise up to date. He recently had fasting labs completed through [facility].  Records requested.    - TDAP VACCINE (Adacel, Boostrix)  [[id number]]    2. Anxiety  Well controlled with daily Lexapro.  Alprazolam 1-2 times/week for acute anxiety.  #30 typically lasts four months.    - ALPRAZolam (XANAX) 0.5 MG tablet; Take 1 tablet (0.5 mg) by mouth daily as needed for anxiety  Dispense: 30 tablet; Refill: 0  - escitalopram (LEXAPRO) 10 MG tablet; Take 1 tablet (10 mg) by mouth daily  Dispense: 90 tablet; Refill: 3    3. Mild episode of recurrent major depressive disorder (H)  Well controlled with Lexapro  - escitalopram (LEXAPRO) 10 MG tablet; Take 1 tablet (10 mg) by mouth daily  Dispense: 90 tablet; Refill: 3    4. Nasal sore  Ulcerated lesion in left nare looks consistent with localized infection. Treat topically with mupirocin. Advised not to touch or pick. Follow up if no improvement.   - mupirocin (BACTROBAN) 2 % external ointment; Apply topically 3 times daily  Dispense: 30 g; Refill: 1    5. Nicotine dependence, uncomplicated, unspecified nicotine product type  Smokeless tobacco. Advised on cessation.  He has been focusing on sobriety though intends to tackle this in the near future.  Total time on tobacco cessation 3 minutes    6. Dyslipidemia  Recent labs with [facility], records requested.  No prior treatment    Patient has been advised of split billing  "requirements and indicates understanding: Yes  COUNSELING:   Reviewed preventive health counseling, as reflected in patient instructions    Estimated body mass index is 24.71 kg/m  as calculated from the following:    Height as of this encounter: 1.885 m (6' 2.21\").    Weight as of this encounter: 87.8 kg (193 lb 9.6 oz).         He reports that he has been smoking. He has a 4.00 pack-year smoking history. He has never used smokeless tobacco.  Tobacco Cessation Action Plan:   Information offered: Patient not interested at this time      Counseling Resources:  ATP IV Guidelines  Pooled Cohorts Equation Calculator  FRAX Risk Assessment  ICSI Preventive Guidelines  Dietary Guidelines for Americans, 2010  USDA's MyPlate  ASA Prophylaxis  Lung CA Screening    Franny Quiles CNP  [facility]  "

## 2022-02-25 ENCOUNTER — OFFICE VISIT (OUTPATIENT)
Dept: FAMILY MEDICINE | Facility: CLINIC | Age: 32
End: 2022-02-25
Payer: COMMERCIAL

## 2022-02-25 VITALS
BODY MASS INDEX: 25.77 KG/M2 | SYSTOLIC BLOOD PRESSURE: 134 MMHG | OXYGEN SATURATION: 95 % | WEIGHT: 201.9 LBS | HEART RATE: 86 BPM | DIASTOLIC BLOOD PRESSURE: 80 MMHG

## 2022-02-25 DIAGNOSIS — S97.121A: Primary | ICD-10-CM

## 2022-02-25 DIAGNOSIS — S92.534A CLOSED NONDISPLACED FRACTURE OF DISTAL PHALANX OF LESSER TOE OF RIGHT FOOT, INITIAL ENCOUNTER: ICD-10-CM

## 2022-02-25 PROCEDURE — 99213 OFFICE O/P EST LOW 20 MIN: CPT | Performed by: FAMILY MEDICINE

## 2022-02-25 RX ORDER — DIAZEPAM 5 MG
TABLET ORAL
COMMUNITY
Start: 2021-11-19 | End: 2022-05-19

## 2022-02-25 RX ORDER — IBUPROFEN 600 MG/1
TABLET, FILM COATED ORAL
COMMUNITY
Start: 2021-11-19 | End: 2022-11-19

## 2022-02-25 NOTE — PROGRESS NOTES
Assessment and Plan    Crushing injury of second toe, right, initial encounter  - XR Toe Right G/E 2 Views  IMPRESSION: Normal joint spaces and alignment. Nondisplaced fracture of the distal tuft of the distal phalanx of the second toe. Incidental bone island in the proximal phalanx.     Minimal degenerative arthritis of the first MTP joint.     - I did not see the fracture of distal tuft fracture but discussed supportive care, including continued braxton taping    Closed nondisplaced fracture of distal phalanx of lesser toe of right foot, initial encounter  This is a tiny fracture, would not change care outlined above.  I have called the patient twice to review this finding, but without identifying information on voicemail, I cannot leave a detailed message, but did ask him to call back and let us know this number is safe to leave message. Will continue to try to contact him       Return if symptoms worsen or fail to improve.    Jenni Parker MD     -------------------------------------------    Chief concern:     Landen works at [employer].  Two night ago at work a heavy pallet full of eggs rolled onto foot .  He developed immediate pain of his right second toe  and bruising followed - he can walk on heel or side of foot, but has pain with any use of his toes    Current Outpatient Medications   Medication     ALPRAZolam (XANAX) 0.5 MG tablet     escitalopram (LEXAPRO) 10 MG tablet     ibuprofen (ADVIL/MOTRIN) 600 MG tablet     diazepam (VALIUM) 5 MG tablet     mupirocin (BACTROBAN) 2 % external ointment     No current facility-administered medications for this visit.         The history section was last reviewed by Franny Quiles CNP on Jan 17, 2022.    History   Smoking Status     Current Every Day Smoker     Packs/day: 0.50     Years: 8.00   Smokeless Tobacco     Never Used     Comment: e-cig       Social History    Substance and Sexual Activity      Alcohol use: Not Currently        /80 (BP Location:  Left arm, Patient Position: Sitting, Cuff Size: Adult Regular)   Pulse 86   Wt 91.6 kg (201 lb 14.4 oz)   SpO2 95%   BMI 25.77 kg/m    Body mass index is 25.77 kg/m .     EXAM:    General appearance - alert, well appearing, and in no distress  Mental status - normal mood, behavior, speech, dress, motor activity, and thought processes  Musculoskeletal -     - pain with passive movement distal interphalangeal joint  second right toe, but not proximal interphalangeal joint    - bruising distal second right toe both on top and bottom

## 2022-03-26 ENCOUNTER — HEALTH MAINTENANCE LETTER (OUTPATIENT)
Age: 32
End: 2022-03-26

## 2022-06-02 DIAGNOSIS — F41.9 ANXIETY: ICD-10-CM

## 2022-06-02 DIAGNOSIS — F33.0 MILD EPISODE OF RECURRENT MAJOR DEPRESSIVE DISORDER (H): ICD-10-CM

## 2022-06-02 RX ORDER — ESCITALOPRAM OXALATE 10 MG/1
10 TABLET ORAL DAILY
Qty: 90 TABLET | Refills: 3 | Status: SHIPPED | OUTPATIENT
Start: 2022-06-02 | End: 2023-05-26

## 2022-08-27 ENCOUNTER — MYC REFILL (OUTPATIENT)
Dept: FAMILY MEDICINE | Facility: CLINIC | Age: 32
End: 2022-08-27

## 2022-08-27 DIAGNOSIS — F41.9 ANXIETY: ICD-10-CM

## 2022-08-28 RX ORDER — ALPRAZOLAM 0.5 MG
0.5 TABLET ORAL DAILY PRN
Qty: 30 TABLET | Refills: 0 | Status: SHIPPED | OUTPATIENT
Start: 2022-08-28 | End: 2023-05-24

## 2022-08-29 NOTE — TELEPHONE ENCOUNTER
Visit with Franny Quiles 1/17/22 (annual exam)     Depression/anxiety:  Lexapro 10mg daily.  Xanax 1-2 times a week.  Use with acute anxiety and flying.  Feels anxiety has been well controlled for many years on Lexapro.  Also sober over two and a half years which he feels was really the turning point with control of his mental health.      ---    ALPRAZolam (XANAX) 0.5 MG tablet 30 tablet 0 1/17/2022  --   Sig - Route: Take 1 tablet (0.5 mg) by mouth daily as needed for anxiety - Oral   Sent to pharmacy as: ALPRAZolam 0.5 MG Oral Tablet (Xanax)   Class: E-Prescribe   Order: [id number]   E-Prescribing Status: Receipt confirmed by pharmacy (1/17/2022  4:15 PM CST)     ---  Fill Date ID   Written Drug Qty Days Prescriber Rx # Pharmacy Refill   Daily Dose* Pymt Type      01/18/2022  2   01/17/2022  Alprazolam 0.5 MG Tablet    30.00  30 Last Bryson   [id number]   Abdirahman ([id number])   0/0  1.00 LME  [place]   [place]   11/19/2021  1   11/19/2021  Diazepam 5 MG Tablet    5.00  5 Cindy Melina   [id number]   Reg ([id number])   0/0  0.50 [place]   [place]

## 2022-09-18 ENCOUNTER — HEALTH MAINTENANCE LETTER (OUTPATIENT)
Age: 32
End: 2022-09-18

## 2023-05-07 ENCOUNTER — HEALTH MAINTENANCE LETTER (OUTPATIENT)
Age: 33
End: 2023-05-07

## 2023-05-25 DIAGNOSIS — F41.9 ANXIETY: ICD-10-CM

## 2023-05-25 DIAGNOSIS — F33.0 MILD EPISODE OF RECURRENT MAJOR DEPRESSIVE DISORDER (H): ICD-10-CM

## 2023-05-26 RX ORDER — ESCITALOPRAM OXALATE 10 MG/1
10 TABLET ORAL DAILY
Qty: 90 TABLET | Refills: 1 | Status: SHIPPED | OUTPATIENT
Start: 2023-05-26 | End: 2023-07-27

## 2023-05-26 NOTE — TELEPHONE ENCOUNTER
"Former patient of Dr. Clay & has not established care with another provider.  Please assign refill request to covering provider per clinic standard process.    Routing refill request to provider for review/approval because:  PHQ9 needs updating    Last Written Prescription Date:  6/2/22  Last Fill Quantity: 90,  # refills: 3   Last office visit provider:  2/25/22     Requested Prescriptions   Pending Prescriptions Disp Refills     escitalopram (LEXAPRO) 10 MG tablet [Pharmacy Med Name: ESCITALOPRAM OXALATE 10MG TABS] 90 tablet 1     Sig: TAKE 1 TABLET (10 MG) BY MOUTH DAILY       SSRIs Protocol Failed - 5/26/2023 12:29 AM        Failed - PHQ-9 score less than 5 in past 6 months     Please review last PHQ-9 score.           Failed - Recent (6 mo) or future (30 days) visit within the authorizing provider's specialty     Patient had office visit in the last 6 months or has a visit in the next 30 days with authorizing provider or within the authorizing provider's specialty.  See \"Patient Info\" tab in inbasket, or \"Choose Columns\" in Meds & Orders section of the refill encounter.            Passed - Medication is active on med list        Passed - Patient is age 18 or older             Maddie Mcmillan RN 05/26/23 12:30 AM  "

## 2023-07-27 ENCOUNTER — OFFICE VISIT (OUTPATIENT)
Dept: FAMILY MEDICINE | Facility: CLINIC | Age: 33
End: 2023-07-27
Payer: COMMERCIAL

## 2023-07-27 VITALS
BODY MASS INDEX: 29.46 KG/M2 | HEIGHT: 74 IN | OXYGEN SATURATION: 98 % | RESPIRATION RATE: 14 BRPM | TEMPERATURE: 97 F | DIASTOLIC BLOOD PRESSURE: 78 MMHG | SYSTOLIC BLOOD PRESSURE: 128 MMHG | WEIGHT: 229.6 LBS | HEART RATE: 92 BPM

## 2023-07-27 DIAGNOSIS — Z11.4 SCREENING FOR HIV (HUMAN IMMUNODEFICIENCY VIRUS): Primary | ICD-10-CM

## 2023-07-27 DIAGNOSIS — Z13.21 SCREENING FOR ENDOCRINE, NUTRITIONAL, METABOLIC AND IMMUNITY DISORDER: ICD-10-CM

## 2023-07-27 DIAGNOSIS — Z72.51 HIGH RISK SEXUAL BEHAVIOR, UNSPECIFIED TYPE: ICD-10-CM

## 2023-07-27 DIAGNOSIS — Z13.0 SCREENING FOR ENDOCRINE, NUTRITIONAL, METABOLIC AND IMMUNITY DISORDER: ICD-10-CM

## 2023-07-27 DIAGNOSIS — Z11.59 NEED FOR HEPATITIS C SCREENING TEST: ICD-10-CM

## 2023-07-27 DIAGNOSIS — Z13.228 SCREENING FOR ENDOCRINE, NUTRITIONAL, METABOLIC AND IMMUNITY DISORDER: ICD-10-CM

## 2023-07-27 DIAGNOSIS — F33.0 MILD EPISODE OF RECURRENT MAJOR DEPRESSIVE DISORDER (H): ICD-10-CM

## 2023-07-27 DIAGNOSIS — L73.2 HIDRADENITIS: ICD-10-CM

## 2023-07-27 DIAGNOSIS — Z13.29 SCREENING FOR ENDOCRINE, NUTRITIONAL, METABOLIC AND IMMUNITY DISORDER: ICD-10-CM

## 2023-07-27 DIAGNOSIS — F41.9 ANXIETY: ICD-10-CM

## 2023-07-27 DIAGNOSIS — Z11.59 NEED FOR HEPATITIS B SCREENING TEST: ICD-10-CM

## 2023-07-27 LAB
ALBUMIN SERPL BCG-MCNC: 4.9 G/DL (ref 3.5–5.2)
ALP SERPL-CCNC: 88 U/L (ref 35–129)
ALT SERPL W P-5'-P-CCNC: 98 U/L (ref 0–70)
ANION GAP SERPL CALCULATED.3IONS-SCNC: 9 MMOL/L (ref 7–15)
AST SERPL W P-5'-P-CCNC: 57 U/L (ref 0–45)
BASOPHILS # BLD AUTO: 0 10E3/UL (ref 0–0.2)
BASOPHILS NFR BLD AUTO: 1 %
BILIRUB SERPL-MCNC: 0.8 MG/DL
BUN SERPL-MCNC: 12.7 MG/DL (ref 6–20)
CALCIUM SERPL-MCNC: 10 MG/DL (ref 8.6–10)
CHLORIDE SERPL-SCNC: 103 MMOL/L (ref 98–107)
CHOLEST SERPL-MCNC: 220 MG/DL
CREAT SERPL-MCNC: 0.89 MG/DL (ref 0.51–1.17)
DEPRECATED HCO3 PLAS-SCNC: 27 MMOL/L (ref 22–29)
EOSINOPHIL # BLD AUTO: 0.1 10E3/UL (ref 0–0.7)
EOSINOPHIL NFR BLD AUTO: 2 %
ERYTHROCYTE [DISTWIDTH] IN BLOOD BY AUTOMATED COUNT: 12 % (ref 10–15)
GFR SERPL CREATININE-BSD FRML MDRD: >90 ML/MIN/1.73M2
GLUCOSE SERPL-MCNC: 88 MG/DL (ref 70–99)
HCT VFR BLD AUTO: 43.3 % (ref 35–53)
HCV AB SERPL QL IA: NONREACTIVE
HDLC SERPL-MCNC: 31 MG/DL
HGB BLD-MCNC: 15 G/DL (ref 11.7–17.7)
HIV 1+2 AB+HIV1 P24 AG SERPL QL IA: NONREACTIVE
IMM GRANULOCYTES # BLD: 0 10E3/UL
IMM GRANULOCYTES NFR BLD: 0 %
LDLC SERPL CALC-MCNC: 151 MG/DL
LYMPHOCYTES # BLD AUTO: 1.9 10E3/UL (ref 0.8–5.3)
LYMPHOCYTES NFR BLD AUTO: 33 %
MCH RBC QN AUTO: 30.1 PG (ref 26.5–33)
MCHC RBC AUTO-ENTMCNC: 34.6 G/DL (ref 31.5–36.5)
MCV RBC AUTO: 87 FL (ref 78–100)
MONOCYTES # BLD AUTO: 0.5 10E3/UL (ref 0–1.3)
MONOCYTES NFR BLD AUTO: 9 %
NEUTROPHILS # BLD AUTO: 3.2 10E3/UL (ref 1.6–8.3)
NEUTROPHILS NFR BLD AUTO: 55 %
NONHDLC SERPL-MCNC: 189 MG/DL
PLATELET # BLD AUTO: 203 10E3/UL (ref 150–450)
POTASSIUM SERPL-SCNC: 5.2 MMOL/L (ref 3.4–5.3)
PROT SERPL-MCNC: 7.2 G/DL (ref 6.4–8.3)
RBC # BLD AUTO: 4.99 10E6/UL (ref 3.8–5.9)
SODIUM SERPL-SCNC: 139 MMOL/L (ref 136–145)
T PALLIDUM AB SER QL: NONREACTIVE
TRIGL SERPL-MCNC: 188 MG/DL
TSH SERPL DL<=0.005 MIU/L-ACNC: 2.49 UIU/ML (ref 0.3–4.2)
WBC # BLD AUTO: 5.8 10E3/UL (ref 4–11)

## 2023-07-27 PROCEDURE — 85025 COMPLETE CBC W/AUTO DIFF WBC: CPT | Performed by: FAMILY MEDICINE

## 2023-07-27 PROCEDURE — 99214 OFFICE O/P EST MOD 30 MIN: CPT | Performed by: FAMILY MEDICINE

## 2023-07-27 PROCEDURE — 84443 ASSAY THYROID STIM HORMONE: CPT | Performed by: FAMILY MEDICINE

## 2023-07-27 PROCEDURE — 87591 N.GONORRHOEAE DNA AMP PROB: CPT | Performed by: FAMILY MEDICINE

## 2023-07-27 PROCEDURE — 86709 HEPATITIS A IGM ANTIBODY: CPT | Performed by: FAMILY MEDICINE

## 2023-07-27 PROCEDURE — 87389 HIV-1 AG W/HIV-1&-2 AB AG IA: CPT | Performed by: FAMILY MEDICINE

## 2023-07-27 PROCEDURE — 87536 HIV-1 QUANT&REVRSE TRNSCRPJ: CPT | Performed by: FAMILY MEDICINE

## 2023-07-27 PROCEDURE — 86803 HEPATITIS C AB TEST: CPT | Performed by: FAMILY MEDICINE

## 2023-07-27 PROCEDURE — 80053 COMPREHEN METABOLIC PANEL: CPT | Performed by: FAMILY MEDICINE

## 2023-07-27 PROCEDURE — 86780 TREPONEMA PALLIDUM: CPT | Performed by: FAMILY MEDICINE

## 2023-07-27 PROCEDURE — 80061 LIPID PANEL: CPT | Performed by: FAMILY MEDICINE

## 2023-07-27 PROCEDURE — 87340 HEPATITIS B SURFACE AG IA: CPT | Performed by: FAMILY MEDICINE

## 2023-07-27 PROCEDURE — 86708 HEPATITIS A ANTIBODY: CPT | Performed by: FAMILY MEDICINE

## 2023-07-27 PROCEDURE — 36415 COLL VENOUS BLD VENIPUNCTURE: CPT | Performed by: FAMILY MEDICINE

## 2023-07-27 PROCEDURE — 87491 CHLMYD TRACH DNA AMP PROBE: CPT | Performed by: FAMILY MEDICINE

## 2023-07-27 RX ORDER — ESCITALOPRAM OXALATE 10 MG/1
10 TABLET ORAL DAILY
Qty: 90 TABLET | Refills: 1 | Status: SHIPPED | OUTPATIENT
Start: 2023-07-27 | End: 2024-03-01

## 2023-07-27 RX ORDER — ALPRAZOLAM 0.5 MG
0.5 TABLET ORAL DAILY PRN
Qty: 30 TABLET | Refills: 0 | Status: SHIPPED | OUTPATIENT
Start: 2023-07-27 | End: 2024-06-11

## 2023-07-27 RX ORDER — DOXYCYCLINE HYCLATE 100 MG
100 TABLET ORAL 2 TIMES DAILY
Qty: 20 TABLET | Refills: 0 | Status: SHIPPED | OUTPATIENT
Start: 2023-07-27 | End: 2023-08-30

## 2023-07-27 ASSESSMENT — PATIENT HEALTH QUESTIONNAIRE - PHQ9
SUM OF ALL RESPONSES TO PHQ QUESTIONS 1-9: 4
SUM OF ALL RESPONSES TO PHQ QUESTIONS 1-9: 4

## 2023-07-27 ASSESSMENT — PAIN SCALES - GENERAL: PAINLEVEL: MODERATE PAIN (4)

## 2023-07-27 ASSESSMENT — ANXIETY QUESTIONNAIRES
5. BEING SO RESTLESS THAT IT IS HARD TO SIT STILL: NOT AT ALL
3. WORRYING TOO MUCH ABOUT DIFFERENT THINGS: NOT AT ALL
GAD7 TOTAL SCORE: 4
7. FEELING AFRAID AS IF SOMETHING AWFUL MIGHT HAPPEN: NOT AT ALL
8. IF YOU CHECKED OFF ANY PROBLEMS, HOW DIFFICULT HAVE THESE MADE IT FOR YOU TO DO YOUR WORK, TAKE CARE OF THINGS AT HOME, OR GET ALONG WITH OTHER PEOPLE?: SOMEWHAT DIFFICULT
2. NOT BEING ABLE TO STOP OR CONTROL WORRYING: NOT AT ALL
IF YOU CHECKED OFF ANY PROBLEMS ON THIS QUESTIONNAIRE, HOW DIFFICULT HAVE THESE PROBLEMS MADE IT FOR YOU TO DO YOUR WORK, TAKE CARE OF THINGS AT HOME, OR GET ALONG WITH OTHER PEOPLE: SOMEWHAT DIFFICULT
6. BECOMING EASILY ANNOYED OR IRRITABLE: MORE THAN HALF THE DAYS
7. FEELING AFRAID AS IF SOMETHING AWFUL MIGHT HAPPEN: NOT AT ALL
1. FEELING NERVOUS, ANXIOUS, OR ON EDGE: SEVERAL DAYS
GAD7 TOTAL SCORE: 4
GAD7 TOTAL SCORE: 4
4. TROUBLE RELAXING: SEVERAL DAYS

## 2023-07-27 NOTE — PROGRESS NOTES
Assessment & Plan   Anxiety  He reports that he has 10 alprazolam left from 40 in a year. No sign of misuse. He has had counseling.  - escitalopram (LEXAPRO) 10 MG tablet  Dispense: 90 tablet; Refill: 1  - ALPRAZolam (XANAX) 0.5 MG tablet  Dispense: 30 tablet; Refill: 0    Hidradenitis  It is not abscessing at this time. He has a cat, so that may be the cause. Asked to contact me immediately if it does not respond quickly.   - doxycycline hyclate (VIBRA-TABS) 100 MG tablet  Dispense: 20 tablet; Refill: 0    Mild episode of recurrent major depressive disorder (H)  He feels this is working well for him.  - escitalopram (LEXAPRO) 10 MG tablet  Dispense: 90 tablet; Refill: 1    Screening for HIV (human immunodeficiency virus)  - HIV Antigen Antibody Combo  - HIV-1 RNA quantitative    Need for hepatitis C screening test  - Hepatitis C Screen Reflex to HCV RNA Quant and Genotype    Screening for endocrine, nutritional, metabolic and immunity disorder  - Lipid panel reflex to direct LDL Fasting  - CBC with platelets and differential  - Comprehensive metabolic panel (BMP + Alb, Alk Phos, ALT, AST, Total. Bili, TP)  - TSH with free T4 reflex  - Chlamydia trachomatis/Neisseria gonorrhoeae by PCR - Clinic Collect  - Treponema Abs w Reflex to RPR and Titer  - Lipid panel reflex to direct LDL Fasting  - Treponema Abs w Reflex to RPR and Titer  - Hepatitis B surface antigen  - Hepatitis A antibody IgM  - Hepatitis Antibody A IgG  - Hepatitis B surface antigen  - Hepatitis A antibody IgM  - Hepatitis Antibody A IgG  - Hepatitis B surface antigen  - AST-- in three months.    High risk sexual behavior, unspecified type  - emtricitabine-tenofovir AF (DESCOVY) 200-25 MG per tablet  Dispense: 90 tablet; Refill: 0  - HIV-1 RNA quantitative-- every three months    Need for hepatitis B screening test  -HBsAb with trivial increase in alt/ast in the face of PrEP.  - Hepatitis B surface antigen    Nicotine/Tobacco Cessation:  Landen Biswas  "reports that Landen Biswas has been smoking cigarettes. Landen Biswas has a 4.00 pack-year smoking history. Landen Biswas has never used smokeless tobacco.  Nicotine/Tobacco Cessation Plan:   Self help information given to patient Not ready yet. He is otherwise sober.      BMI:   Estimated body mass index is 29.48 kg/m  as calculated from the following:    Height as of this encounter: 1.88 m (6' 2\").    Weight as of this encounter: 104.1 kg (229 lb 9.6 oz).   KINZA LAMA MD  [facility]    Subjective   Landen is a 33 year old, presenting for the following health issues:  Recheck Medication, Establish Care, and Skin Tags      7/27/2023     8:20 AM   Additional Questions   Roomed by keila sun       Skin tags.  Left armpit is swollen and inflamed and the whole shoulder is irritable. No fever. No cat scratches recently, has cats. No tick exposure.  Takes alprzaolam when absoolutely necessary. He has triggers. It triggers a heightened self-awarness and the tragedy of the world. Driving did trigger, but he is able to listen to audiobooks and ground himself.  No side effects.  Sober over 4 years. Raging alcoholic for half my life. Had an empaiphany. Has a sober groups and community of friends. Counseling was difficult to schedule.    History of Present Illness       Mental Health Follow-up:  Patient presents to follow-up on Depression & Anxiety.Patient's depression since last visit has been:  Good  The patient is not having other symptoms associated with depression.  Patient's anxiety since last visit has been:  No change  The patient is not having other symptoms associated with anxiety.  Any significant life events: No  Patient is feeling anxious or having panic attacks.  Patient has no concerns about alcohol or drug use.    Landen Biswas eats 2-3 servings of fruits and vegetables daily.Landen Biswas consumes 0 sweetened beverage(s) daily.Landen Biswas exercises with enough effort to increase Landen HANDY" "Zulay's heart rate 30 to 60 minutes per day.  Landen Biswas exercises with enough effort to increase Landen Biswas's heart rate 3 or less days per week.   Landen Biswas is taking medications regularly.       Objective    /78 (BP Location: Left arm, Patient Position: Sitting, Cuff Size: Adult Large)   Pulse 92   Temp 97  F (36.1  C)   Resp 14   Ht 1.88 m (6' 2\")   Wt 104.1 kg (229 lb 9.6 oz)   SpO2 98%   BMI 29.48 kg/m    Body mass index is 29.48 kg/m .  Physical Exam   GENERAL: healthy, alert and no distress  EYES: Eyes grossly normal to inspection, PERRL and conjunctivae and sclerae normal  HENT: ear canals and TM's normal, nose and mouth without ulcers or lesions  NECK: no adenopathy, no asymmetry, masses, or scars and thyroid normal to palpation  RESP: lungs clear to auscultation - no rales, rhonchi or wheezes  CV: regular rate and rhythm, normal S1 S2, no S3 or S4, no murmur, click or rub, no peripheral edema and peripheral pulses strong  ABDOMEN: soft, nontender, no hepatosplenomegaly, no masses and bowel sounds normal  MS: no gross musculoskeletal defects noted, no edema  SKIN: no suspicious lesions. Three are numerous little skin tags over his neck. There is some faint, geographic erythema over the left shoulder and a tender lymph node in the left axilla measuring about 2x3.5 cm. No overlying erythema, it is not fluctuant. No other adenopathy.  NEURO: Normal strength and tone, mentation intact and speech normal  PSYCH: mentation appears normal, affect normal/bright    Results for orders placed or performed in visit on 07/27/23   HIV Antigen Antibody Combo     Status: Normal   Result Value Ref Range    HIV Antigen Antibody Combo Nonreactive Nonreactive   Hepatitis C Screen Reflex to HCV RNA Quant and Genotype     Status: Normal   Result Value Ref Range    Hepatitis C Antibody Nonreactive Nonreactive    Narrative    Assay performance characteristics have not been established for newborns, infants, and " children.   HIV-1 RNA quantitative     Status: Normal   Result Value Ref Range    HIV-1 RNA copies/mL Not Detected Not Detected Copies/mL    Narrative    The rodríguez  HIV-1 assay is an FDA-approved in vitro nucleic acid amplification test for the quantification of HIV-1 RNA in human  plasma, using the Roche rodríguez  6800 instrument for automated viral nucleic acid extraction (silica-based capture technique) and automated amplification and detection of the viral nucleic acid target sequences. This assay amplifies sequences within the gag gene and long terminal repeat region of the HIV-1 genome and generates amplification products that are detected and quantified in real time with 2 sequence-specific TaqMan probes. The test is intended for use with clinical presentation and other laboratory markers for the clinical management of HIV-1 infected patients. Titer results are reported in copies/mL.    Lipid panel reflex to direct LDL Fasting     Status: Abnormal   Result Value Ref Range    Cholesterol 220 (H) <200 mg/dL    Triglycerides 188 (H) <150 mg/dL    Direct Measure HDL 31 (L) >=40 mg/dL    LDL Cholesterol Calculated 151 (H) <=100 mg/dL    Non HDL Cholesterol 189 (H) <130 mg/dL    Narrative    Cholesterol  Desirable:  <200 mg/dL    Triglycerides  Normal:  Less than 150 mg/dL  Borderline High:  150-199 mg/dL  High:  200-499 mg/dL  Very High:  Greater than or equal to 500 mg/dL    Direct Measure HDL  Female:  Greater than or equal to 50 mg/dL   Male:  Greater than or equal to 40 mg/dL    LDL Cholesterol  Desirable:  <100mg/dL  Above Desirable:  100-129 mg/dL   Borderline High:  130-159 mg/dL   High:  160-189 mg/dL   Very High:  >= 190 mg/dL    Non HDL Cholesterol  Desirable:  130 mg/dL  Above Desirable:  130-159 mg/dL  Borderline High:  160-189 mg/dL  High:  190-219 mg/dL  Very High:  Greater than or equal to 220 mg/dL   Comprehensive metabolic panel (BMP + Alb, Alk Phos, ALT, AST, Total. Bili, TP)     Status: Abnormal  "  Result Value Ref Range    Sodium 139 136 - 145 mmol/L    Potassium 5.2 3.4 - 5.3 mmol/L    Chloride 103 98 - 107 mmol/L    Carbon Dioxide (CO2) 27 22 - 29 mmol/L    Anion Gap 9 7 - 15 mmol/L    Urea Nitrogen 12.7 6.0 - 20.0 mg/dL    Creatinine 0.89 0.51 - 1.17 mg/dL    Calcium 10.0 8.6 - 10.0 mg/dL    Glucose 88 70 - 99 mg/dL    Alkaline Phosphatase 88 35 - 129 U/L    AST 57 (H) 0 - 45 U/L    ALT 98 (H) 0 - 70 U/L    Protein Total 7.2 6.4 - 8.3 g/dL    Albumin 4.9 3.5 - 5.2 g/dL    Bilirubin Total 0.8 <=1.2 mg/dL    GFR Estimate >90 >60 mL/min/1.73m2    Narrative    The generation of reference intervals for this test is currently based on binary male or female sex. If the electronic health record information indicates another gender identity or if Legal Sex is recorded as \"Unknown\", both male and female reference intervals are provided where applicable, and should be considered according to the individual's appropriate clinical context.   TSH with free T4 reflex     Status: Normal   Result Value Ref Range    TSH 2.49 0.30 - 4.20 uIU/mL   Treponema Abs w Reflex to RPR and Titer     Status: Normal   Result Value Ref Range    Treponema Antibody Total Nonreactive Nonreactive   CBC with platelets and differential     Status: None   Result Value Ref Range    WBC Count 5.8 4.0 - 11.0 10e3/uL    RBC Count 4.99 3.80 - 5.90 10e6/uL    Hemoglobin 15.0 11.7 - 17.7 g/dL    Hematocrit 43.3 35.0 - 53.0 %    MCV 87 78 - 100 fL    MCH 30.1 26.5 - 33.0 pg    MCHC 34.6 31.5 - 36.5 g/dL    RDW 12.0 10.0 - 15.0 %    Platelet Count 203 150 - 450 10e3/uL    % Neutrophils 55 %    % Lymphocytes 33 %    % Monocytes 9 %    % Eosinophils 2 %    % Basophils 1 %    % Immature Granulocytes 0 %    Absolute Neutrophils 3.2 1.6 - 8.3 10e3/uL    Absolute Lymphocytes 1.9 0.8 - 5.3 10e3/uL    Absolute Monocytes 0.5 0.0 - 1.3 10e3/uL    Absolute Eosinophils 0.1 0.0 - 0.7 10e3/uL    Absolute Basophils 0.0 0.0 - 0.2 10e3/uL    Absolute Immature " "Granulocytes 0.0 <=0.4 10e3/uL    Narrative    The generation of reference intervals for this test is currently based on binary male or female sex. If the electronic health record information indicates another gender identity or if Legal Sex is recorded as \"Unknown\", both male and female reference intervals are provided where applicable, and should be considered according to the individual's appropriate clinical context.   Hepatitis B surface antigen     Status: Normal   Result Value Ref Range    Hepatitis B Surface Antigen Nonreactive Nonreactive   Hepatitis A antibody IgM     Status: Normal   Result Value Ref Range    Hepatitis A Antibody IgM Nonreactive Nonreactive   Hepatitis Antibody A IgG     Status: Abnormal   Result Value Ref Range    Hepatitis A Antibody IgG Reactive (A) Nonreactive    Narrative    This assay cannot be used for the diagnosis of acute HAV infection.   Chlamydia trachomatis/Neisseria gonorrhoeae by PCR - Clinic Collect     Status: Normal    Specimen: Urine, Voided   Result Value Ref Range    Chlamydia Trachomatis Negative Negative    Neisseria gonorrhoeae Negative Negative   CBC with platelets and differential     Status: None    Narrative    The following orders were created for panel order CBC with platelets and differential.  Procedure                               Abnormality         Status                     ---------                               -----------         ------                     CBC with platelets and d...[[id number]]                      Final result                 Please view results for these tests on the individual orders.     "

## 2023-07-28 LAB
C TRACH DNA SPEC QL PROBE+SIG AMP: NEGATIVE
N GONORRHOEA DNA SPEC QL NAA+PROBE: NEGATIVE

## 2023-07-29 LAB — HIV1 RNA # PLAS NAA DL=20: NOT DETECTED COPIES/ML

## 2023-07-31 LAB
HAV IGG SER QL IA: REACTIVE
HAV IGM SERPL QL IA: NONREACTIVE
HBV SURFACE AG SERPL QL IA: NONREACTIVE

## 2023-08-01 NOTE — RESULT ENCOUNTER NOTE
Hello.    Fun, it looks like my message didn't send.... here it is again.    How is the lymph node and rash? Have they resolved?    You are immune to Hepatitis A, but not B. I have ordered that 3 dose series because it is transmitted through bodily fluid. You can schedule that with a nurse visit. I have ordered the hepatitis B antigen to make sure you don't have it. (It can be done on the blood from 5 days ago.)    Your liver tests are barely elevated. I will repeat the ast in three months to make sure it normalizes.    I have ordered Descovy and your next HIV test. You can get it at the end of October.    The cholesterol does not require medicine treatment at this time, but it would be best if you tried to reduce it with lowering the saturated and trans fats in your diet. You can tell those fats because they are solid or semisolid at room temperature. On packages, don't buy stuff with trans fats or more than 3% saturated fats. Trans fats are also in the fake butter at movie theaters.    Please continue with your current plan of care and let us know if you have any questions or concerns.    Best wishes,  Patrizia Angela MD

## 2023-08-30 ENCOUNTER — OFFICE VISIT (OUTPATIENT)
Dept: FAMILY MEDICINE | Facility: CLINIC | Age: 33
End: 2023-08-30
Payer: COMMERCIAL

## 2023-08-30 VITALS
HEART RATE: 83 BPM | HEIGHT: 74 IN | BODY MASS INDEX: 29.34 KG/M2 | DIASTOLIC BLOOD PRESSURE: 80 MMHG | TEMPERATURE: 98.7 F | RESPIRATION RATE: 14 BRPM | WEIGHT: 228.6 LBS | SYSTOLIC BLOOD PRESSURE: 134 MMHG

## 2023-08-30 DIAGNOSIS — J30.1 NON-SEASONAL ALLERGIC RHINITIS DUE TO POLLEN: ICD-10-CM

## 2023-08-30 DIAGNOSIS — Z23 NEED FOR VACCINATION: Primary | ICD-10-CM

## 2023-08-30 PROCEDURE — 90677 PCV20 VACCINE IM: CPT | Performed by: FAMILY MEDICINE

## 2023-08-30 PROCEDURE — 90675 RABIES VACCINE IM: CPT | Performed by: FAMILY MEDICINE

## 2023-08-30 PROCEDURE — 90472 IMMUNIZATION ADMIN EACH ADD: CPT | Performed by: FAMILY MEDICINE

## 2023-08-30 PROCEDURE — 90632 HEPA VACCINE ADULT IM: CPT | Performed by: FAMILY MEDICINE

## 2023-08-30 PROCEDURE — 90471 IMMUNIZATION ADMIN: CPT | Performed by: FAMILY MEDICINE

## 2023-08-30 PROCEDURE — 99213 OFFICE O/P EST LOW 20 MIN: CPT | Mod: 25 | Performed by: FAMILY MEDICINE

## 2023-08-30 PROCEDURE — 90746 HEPB VACCINE 3 DOSE ADULT IM: CPT | Performed by: FAMILY MEDICINE

## 2023-08-30 ASSESSMENT — PAIN SCALES - GENERAL: PAINLEVEL: NO PAIN (0)

## 2023-08-30 NOTE — NURSING NOTE
Prior to immunization administration, verified patients identity using patient s name and date of birth. Please see Immunization Activity for additional information.     Screening Questionnaire for Adult Immunization    Are you sick today?   No   Do you have allergies to medications, food, a vaccine component or latex?   No   Have you ever had a serious reaction after receiving a vaccination?   No   Do you have a long-term health problem with heart, lung, kidney, or metabolic disease (e.g., diabetes), asthma, a blood disorder, no spleen, complement component deficiency, a cochlear implant, or a spinal fluid leak?  Are you on long-term aspirin therapy?   No   Do you have cancer, leukemia, HIV/AIDS, or any other immune system problem?   No   Do you have a parent, brother, or sister with an immune system problem?   No   In the past 3 months, have you taken medications that affect  your immune system, such as prednisone, other steroids, or anticancer drugs; drugs for the treatment of rheumatoid arthritis, Crohn s disease, or psoriasis; or have you had radiation treatments?   No   Have you had a seizure, or a brain or other nervous system problem?   No   During the past year, have you received a transfusion of blood or blood    products, or been given immune (gamma) globulin or antiviral drug?   No   For women: Are you pregnant or is there a chance you could become       pregnant during the next month?   No   Have you received any vaccinations in the past 4 weeks?   Yes     Immunization questionnaire was positive for at least one answer.  MD Aware.    Pt tolerated injections (Hep A # 1 of 2, Hep B #1 of 3, Prevnar 20, Rabies # 3 of 4). Sites (right and left deltoid) were cleansed with alcohol prior to injections. No pain, burning, swelling or redness at the site of the injection. Patient instructed to remain in clinic for 15 minutes afterwards, and to report any adverse reactions.    Pt advised of vaccine schedule and  will make appointments accordingly. Pt expressed understanding.     Screening performed by Lynette Aguilera RN on 8/30/2023 at 11:33 AM.

## 2023-08-30 NOTE — NURSING NOTE
Clinic Administered Medication Documentation      Patient was given Imovax Rabies Vaccine. Prior to medication administration, verified patient's identity using patient s name and date of birth. Please see MAR and medication order for additional information. Patient instructed to remain in clinic for 15 minutes, report any adverse reaction to staff immediately, and remain in clinic for 15 minutes and report any adverse reaction to staff immediately but patient declined.    Vial/Syringe: Syringe

## 2023-08-30 NOTE — PROGRESS NOTES
"Assessment & Plan   Need for vaccination  Fourth dose in a week  - RABIES VACCINE, IM (IMOVAX)    Non-seasonal allergic rhinitis due to pollen  Instead of Zyrtec twice a day, try loratadine twice a day.  They had some allergy shots when young after a massively positive allergy testing at age 4.  BMI:   Estimated body mass index is 29.34 kg/m  as calculated from the following:    Height as of this encounter: 1.88 m (6' 2.02\").    Weight as of this encounter: 103.7 kg (228 lb 9.6 oz).   KINZA LAMA MD  [facility]    Franco Schuster is a 33 year old, presenting for the following health issues:  Follow Up (Rabies vaccine #3/Pt received rabies vaccine #1 and #2 at [facility]. )        8/30/2023     9:44 AM   Additional Questions   Roomed by Lynette   They had two Rabaverts 8 days and 3 days and due now. There was a bat in their house. The [facility] animal control indicated that rabies is common. No definite exposure to saliva and they did not get the bat. The house is sealed against bats now.  They are also wondering about allergies. Nasal congestion and a scratchy throat for two weeks. They normally take Zyrtec year around, but it isn't helping now.  Emotionally, they are doing well.    History of Present Illness       Reason for visit:  Rabies vaccination    Landen Biswas eats 2-3 servings of fruits and vegetables daily.Landen Biswas consumes 0 sweetened beverage(s) daily.Landen Biswas exercises with enough effort to increase Landen Biswas's heart rate 20 to 29 minutes per day.  Landen HANDY Zulay exercises with enough effort to increase Landen Banksatt's heart rate 3 or less days per week.   Landen Biswas is taking medications regularly.       Objective    /80 (BP Location: Left arm, Patient Position: Sitting, Cuff Size: Adult Large)   Pulse 83   Temp 98.7  F (37.1  C) (Tympanic)   Resp 14   Ht 1.88 m (6' 2.02\")   Wt 103.7 kg (228 lb 9.6 oz)   BMI 29.34 kg/m    Body mass index is 29.34 " kg/m .  Physical Exam   GENERAL: healthy, alert and no distress  EYES: Eyes grossly normal to inspection, PERRL and conjunctivae and sclerae normal  HENT: ear canals and TM's normal, nose and mouth without ulcers or lesions  NECK: no adenopathy, no asymmetry, masses, or scars and thyroid normal to palpation  RESP: lungs clear to auscultation - no rales, rhonchi or wheezes  CV: regular rate and rhythm, normal S1 S2, no S3 or S4, no murmur, click or rub, no peripheral edema and peripheral pulses strong    Answers submitted by the patient for this visit:  General Questionnaire (Submitted on 8/30/2023)  Chief Complaint: Chronic problems general questions HPI Form  What is the reason for your visit today? : Rabies vaccination  How many servings of fruits and vegetables do you eat daily?: 2-3  On average, how many sweetened beverages do you drink each day (Examples: soda, juice, sweet tea, etc.  Do NOT count diet or artificially sweetened beverages)?: 0  How many minutes a day do you exercise enough to make your heart beat faster?: 20 to 29  How many days a week do you exercise enough to make your heart beat faster?: 3 or less  How many days per week do you miss taking your medication?: 0

## 2023-08-31 NOTE — PATIENT INSTRUCTIONS
Try loratadine twice a day instead of Zyrtec. If that doesn't work, try Flonase (OTC or prescription.)  Get the labs about a week before your Descovy runs out.

## 2023-09-06 ENCOUNTER — ALLIED HEALTH/NURSE VISIT (OUTPATIENT)
Dept: FAMILY MEDICINE | Facility: CLINIC | Age: 33
End: 2023-09-06
Payer: COMMERCIAL

## 2023-09-06 ENCOUNTER — TELEPHONE (OUTPATIENT)
Dept: FAMILY MEDICINE | Facility: CLINIC | Age: 33
End: 2023-09-06

## 2023-09-06 DIAGNOSIS — Z23 NEED FOR VACCINATION: Primary | ICD-10-CM

## 2023-09-06 PROCEDURE — 99207 PR NO CHARGE NURSE ONLY: CPT

## 2023-09-06 PROCEDURE — 90471 IMMUNIZATION ADMIN: CPT

## 2023-09-06 PROCEDURE — 90675 RABIES VACCINE IM: CPT

## 2023-09-06 NOTE — PROGRESS NOTES
Prior to immunization administration, verified patients identity using patient s name and date of birth. Please see Immunization Activity for additional information.     Screening Questionnaire for Adult Immunization    Are you sick today?   No   Do you have allergies to medications, food, a vaccine component or latex?   No   Have you ever had a serious reaction after receiving a vaccination?   No   Do you have a long-term health problem with heart, lung, kidney, or metabolic disease (e.g., diabetes), asthma, a blood disorder, no spleen, complement component deficiency, a cochlear implant, or a spinal fluid leak?  Are you on long-term aspirin therapy?   No   Do you have cancer, leukemia, HIV/AIDS, or any other immune system problem?   No   Do you have a parent, brother, or sister with an immune system problem?   No   In the past 3 months, have you taken medications that affect  your immune system, such as prednisone, other steroids, or anticancer drugs; drugs for the treatment of rheumatoid arthritis, Crohn s disease, or psoriasis; or have you had radiation treatments?   No   Have you had a seizure, or a brain or other nervous system problem?   No   During the past year, have you received a transfusion of blood or blood    products, or been given immune (gamma) globulin or antiviral drug?   No   For women: Are you pregnant or is there a chance you could become       pregnant during the next month?   No   Have you received any vaccinations in the past 4 weeks?   No     Immunization questionnaire answers were all negative.    I have reviewed the following standing orders: Not Applicable; Order were already placed prior to ancillary visit    Patient instructed to remain in clinic for 15 minutes afterwards, and to report any adverse reactions.     Screening performed by Felipe Medrano RN on 9/6/2023 at 11:10 AM.

## 2023-11-06 ENCOUNTER — TELEPHONE (OUTPATIENT)
Dept: FAMILY MEDICINE | Facility: CLINIC | Age: 33
End: 2023-11-06
Payer: COMMERCIAL

## 2023-11-06 NOTE — TELEPHONE ENCOUNTER
----- Message from Patrizia Angela MD sent at 11/1/2023  3:12 PM CDT -----  Regarding: lab visit  He needs this appointment ASAP. He does not need to fast.  Best wishes,  Patrizia Angela MD

## 2024-02-14 ENCOUNTER — OFFICE VISIT (OUTPATIENT)
Dept: FAMILY MEDICINE | Facility: CLINIC | Age: 34
End: 2024-02-14
Payer: COMMERCIAL

## 2024-02-14 VITALS
OXYGEN SATURATION: 100 % | WEIGHT: 226 LBS | SYSTOLIC BLOOD PRESSURE: 136 MMHG | BODY MASS INDEX: 27.52 KG/M2 | HEIGHT: 76 IN | HEART RATE: 81 BPM | DIASTOLIC BLOOD PRESSURE: 80 MMHG | TEMPERATURE: 97.3 F | RESPIRATION RATE: 20 BRPM

## 2024-02-14 DIAGNOSIS — M54.42 ACUTE BILATERAL LOW BACK PAIN WITH BILATERAL SCIATICA: Primary | ICD-10-CM

## 2024-02-14 DIAGNOSIS — M54.41 ACUTE BILATERAL LOW BACK PAIN WITH BILATERAL SCIATICA: Primary | ICD-10-CM

## 2024-02-14 PROCEDURE — 99213 OFFICE O/P EST LOW 20 MIN: CPT

## 2024-02-14 RX ORDER — PREDNISONE 20 MG/1
40 TABLET ORAL DAILY
Qty: 10 TABLET | Refills: 0 | Status: SHIPPED | OUTPATIENT
Start: 2024-02-14 | End: 2024-02-19

## 2024-02-14 RX ORDER — CYCLOBENZAPRINE HCL 10 MG
10 TABLET ORAL 3 TIMES DAILY PRN
Qty: 30 TABLET | Refills: 0 | Status: SHIPPED | OUTPATIENT
Start: 2024-02-14 | End: 2024-02-24

## 2024-02-14 ASSESSMENT — PATIENT HEALTH QUESTIONNAIRE - PHQ9
10. IF YOU CHECKED OFF ANY PROBLEMS, HOW DIFFICULT HAVE THESE PROBLEMS MADE IT FOR YOU TO DO YOUR WORK, TAKE CARE OF THINGS AT HOME, OR GET ALONG WITH OTHER PEOPLE: SOMEWHAT DIFFICULT
SUM OF ALL RESPONSES TO PHQ QUESTIONS 1-9: 3
SUM OF ALL RESPONSES TO PHQ QUESTIONS 1-9: 3

## 2024-02-14 NOTE — PROGRESS NOTES
Assessment & Plan     (M54.42,  M54.41) Acute bilateral low back pain with bilateral sciatica  (primary encounter diagnosis)  Comment: Acute and stable. No signs of respiratory distress, vital signs stable, and no red flag signs requiring immediate need for medical attention.  No red flag alarm signs that would require need for immediate imaging or referral.  Justifiable to move forward with conservative therapy including muscle relaxant, course of prednisone to manage sciatica, over-the-counter pain medication to manage discomfort.  Sent patient with some back stretches and after visit summary that they should begin to use to reduce further injury or atrophy.  Can utilize ice and heat to the site as needed.  Discussed to follow-up in the next few weeks if symptoms do not improve with current plan of care, as this may warrant need for PT referral and/or sports med referral.  Would reconsider need for imaging if symptoms fail to improve after 6 weeks of conservative therapy.  Plan: cyclobenzaprine (FLEXERIL) 10 MG tablet,         predniSONE (DELTASONE) 20 MG tablet      Prescription drug management        FUTURE APPOINTMENTS:       - Follow-up visit in 6 weeks if symptoms worsen or do not improve       - Follow-up for annual visit or as needed  Patient Instructions   Low Back Pain with Sciatica    Pain Management  Begin taking medications to manage back pain today as we discussed. These will work best to manage your pain when taken around the clock rather than every so often when your pain is worse  -Tylenol 325-650mg every 4-6 hours  -Ibuprofen 600-800mg every 6-8 hours  -Flexeril 10mg every 8 hours as needed for muscle spasms    I have also prescribed a course of prednisone to help manage some of the inflammation that may be contributing to your back pain.  You will take 40 mg (2 pills) once daily for 5 days.  This can cause some stomach upset so please take it with food to help to prevent this.    Apply cold  packs to the area for 20min at a time 4 times per day. This will help to reduce swelling and offer some numbing to manage pain. You can also use warm packs to manage pain if this provides better relief.    Movement  It is very important to maintain your normal daily activities as tolerated. Avoiding sitting, laying, standing, or remaining in one position for extended periods of time. Continuing to stretch and move your body will help to encourage healing and keep your muscles loose.    Have attached an educational sheet to your after visit summary which outlines some exercises that can help to manage her low back pain.  If you are having ongoing pain in the coming weeks that does not seem to be improved with the current plan of care, please let me know and I can place a referral for physical therapy.    If physical therapy referral is necessary, I think it is a beneficial option for the pain that you are describing! PT will work with you to build a plan that is customized to your needs and physical abilities. You may feel worse before you feel better, so I encourage you to follow the plan with physical therapy for 6-8 weeks before deciding if it is working or not. Muscle injuries can take time to heal well, and it will require consistent effort!    Next Step  If this plan doesn't seem to be helping after 4-6 weeks then I want you to come back to see me, and we can discuss what the next options might be. Seek emergency medical help If pain becomes unbearable, is not responding to pain medication, or is accompanied by fever, warmth, or loss of function.      Subjective   Landen is a 33 year old, presenting for the following health issues:  Musculoskeletal Problem (Pt c/o painful back spasms since yesterday morning. Pt states spasms have worsened since initial onset. )      2/14/2024     3:52 PM   Additional Questions   Roomed by Duane BOWMAN RN   Landen is a 33-year-old patient with a past medical history significant for  dyslipidemia, depression, and anxiety who presents today with concerns for acute bilateral low back pain radiating into bilateral hips and buttocks.  Patient reports that pain first occurred yesterday, and they are unsure if pain is associated with certain lifting or motions done at work.  Pain presented as a sharp pain in the bilateral lower back that radiated into bilateral buttocks and hips, and nearly dropped patient to the floor.  They report that they did some stretches at home which seem to be helpful to loosen the muscles, and allow them to sleep well overnight, but upon waking up in the morning as a means and pain represented.  Pain is largely constant with acute episodes of exacerbation.  Pain is characterized as a number of different ways including sharp and radiating pain with acute episodes, and tearing and burning pain nearly constantly.  Pain is largely aggravated by any rapid motion, and seems to be worse when more pressure and weight are buried on the left side of the body.  Pain seems to be relieved by reduction in motion, laying flat, and proper bodily ergonomics.  Patient has taken Tylenol and ibuprofen to manage discomfort as well as applying an IcyHot patch to the site, but has noted little to no relief.  Patient declines any fever, chills, systemic illness, night sweats, unintentional weight loss, numbness or tingling in the lower extremities, weakness in the lower extremities, radiating pain up the back, involvement of upper extremities, or loss of bowel or bladder habits.  Patient reports that sensation in all areas of lower back and bilateral lower extremities are completely intact.  Patient declines any chest pain, shortness of breath, fever, GI upset, chills, redness or edema at the affected site, open sores or lesions at the affected site.  Patient does report some reduced range of motion to flexion, extension, and rotation of the torso mostly related to fear of pain and increased pain  "with these motions.    Musculoskeletal Problem    History of Present Illness       Back Pain:  Landen Biswas presents for follow up of back pain. Patient's back pain is a chronic problem.  Location of back pain:  Right lower back, left lower back, right buttock, left buttock, right hip and left hip  Description of back pain: cramping, sharp, shooting and stabbing  Back pain spreads: right buttocks and left buttocks    Since patient first noticed back pain, pain is: gradually worsening  Does back pain interfere with Landen Biswas's job:  Yes       Landen Biswas eats 2-3 servings of fruits and vegetables daily.Landen Biswas consumes 0 sweetened beverage(s) daily.Landen Biswas exercises with enough effort to increase Landen Biswas's heart rate 20 to 29 minutes per day.  Landen Biswas exercises with enough effort to increase Landen Biswas's heart rate 3 or less days per week.   Landen Biswas is taking medications regularly.         Review of Systems  Constitutional, HEENT, cardiovascular, pulmonary, gi and gu systems are negative, except as otherwise noted.      Objective    /80 (BP Location: Left arm, Patient Position: Sitting, Cuff Size: Adult Regular)   Pulse 81   Temp 97.3  F (36.3  C) (Tympanic)   Resp 20   Ht 1.93 m (6' 4\")   Wt 102.5 kg (226 lb)   SpO2 100%   BMI 27.51 kg/m    Body mass index is 27.51 kg/m .  Physical Exam   GENERAL: alert and no distress  NECK: no adenopathy, no asymmetry, masses, or scars  RESP: lungs clear to auscultation - no rales, rhonchi or wheezes  CV: regular rate and rhythm, normal S1 S2, no S3 or S4, no murmur, click or rub, no peripheral edema  ABDOMEN: soft, nontender, no hepatosplenomegaly, no masses and bowel sounds normal  MS: normal muscle tone, no cyanosis, clubbing, or edema, no edema, peripheral pulses normal, no tenderness to palpation, RUE exam shows normal strength and muscle mass, no deformities, no erythema, induration, or nodules, no evidence of joint effusion, and " mildly reduced range of motion to flexion at the hip related to pain with straight leg raising, LUE exam shows no deformities, no erythema, induration, or nodules, no evidence of joint effusion, and no evidence of joint instability, mildly reduced range of motion to flexion at the hip related to pain with straight leg raising, and spine exam shows no scoliosis and mildly reduced range of motion to lateral rotation, extension, and flexion related to pain with these motions  SKIN: no suspicious lesions or rashes  NEURO: Normal strength and tone, mentation intact and speech normal  BACK: no CVA tenderness, no paralumbar tenderness    Jackie Callejas DNP FNP-C  Family Nurse Practitioner - Same Day Provider  [facility] - [place]          Signed Electronically by: OMER Alston CNP

## 2024-02-14 NOTE — PATIENT INSTRUCTIONS
Low Back Pain with Sciatica    Pain Management  Begin taking medications to manage back pain today as we discussed. These will work best to manage your pain when taken around the clock rather than every so often when your pain is worse  -Tylenol 325-650mg every 4-6 hours  -Ibuprofen 600-800mg every 6-8 hours  -Flexeril 10mg every 8 hours as needed for muscle spasms    I have also prescribed a course of prednisone to help manage some of the inflammation that may be contributing to your back pain.  You will take 40 mg (2 pills) once daily for 5 days.  This can cause some stomach upset so please take it with food to help to prevent this.    Apply cold packs to the area for 20min at a time 4 times per day. This will help to reduce swelling and offer some numbing to manage pain. You can also use warm packs to manage pain if this provides better relief.    Movement  It is very important to maintain your normal daily activities as tolerated. Avoiding sitting, laying, standing, or remaining in one position for extended periods of time. Continuing to stretch and move your body will help to encourage healing and keep your muscles loose.    Have attached an educational sheet to your after visit summary which outlines some exercises that can help to manage her low back pain.  If you are having ongoing pain in the coming weeks that does not seem to be improved with the current plan of care, please let me know and I can place a referral for physical therapy.    If physical therapy referral is necessary, I think it is a beneficial option for the pain that you are describing! PT will work with you to build a plan that is customized to your needs and physical abilities. You may feel worse before you feel better, so I encourage you to follow the plan with physical therapy for 6-8 weeks before deciding if it is working or not. Muscle injuries can take time to heal well, and it will require consistent effort!    Next Step  If this plan  doesn't seem to be helping after 4-6 weeks then I want you to come back to see me, and we can discuss what the next options might be. Seek emergency medical help If pain becomes unbearable, is not responding to pain medication, or is accompanied by fever, warmth, or loss of function.

## 2024-03-01 ENCOUNTER — MYC REFILL (OUTPATIENT)
Dept: FAMILY MEDICINE | Facility: CLINIC | Age: 34
End: 2024-03-01
Payer: COMMERCIAL

## 2024-03-01 DIAGNOSIS — F33.0 MILD EPISODE OF RECURRENT MAJOR DEPRESSIVE DISORDER (H): ICD-10-CM

## 2024-03-01 DIAGNOSIS — F41.9 ANXIETY: ICD-10-CM

## 2024-03-01 RX ORDER — ESCITALOPRAM OXALATE 10 MG/1
10 TABLET ORAL DAILY
Qty: 90 TABLET | Refills: 0 | Status: SHIPPED | OUTPATIENT
Start: 2024-03-01 | End: 2024-06-11

## 2024-03-08 ENCOUNTER — OFFICE VISIT (OUTPATIENT)
Dept: FAMILY MEDICINE | Facility: CLINIC | Age: 34
End: 2024-03-08
Payer: COMMERCIAL

## 2024-03-08 VITALS
OXYGEN SATURATION: 99 % | HEIGHT: 76 IN | DIASTOLIC BLOOD PRESSURE: 84 MMHG | TEMPERATURE: 97.9 F | WEIGHT: 231.3 LBS | RESPIRATION RATE: 12 BRPM | HEART RATE: 99 BPM | BODY MASS INDEX: 28.17 KG/M2 | SYSTOLIC BLOOD PRESSURE: 132 MMHG

## 2024-03-08 DIAGNOSIS — M54.42 CHRONIC LEFT-SIDED LOW BACK PAIN WITH LEFT-SIDED SCIATICA: Primary | ICD-10-CM

## 2024-03-08 DIAGNOSIS — G89.29 CHRONIC LEFT-SIDED LOW BACK PAIN WITH LEFT-SIDED SCIATICA: Primary | ICD-10-CM

## 2024-03-08 PROCEDURE — 99214 OFFICE O/P EST MOD 30 MIN: CPT

## 2024-03-08 RX ORDER — PREDNISONE 20 MG/1
40 TABLET ORAL DAILY
Qty: 10 TABLET | Refills: 0 | Status: SHIPPED | OUTPATIENT
Start: 2024-03-08 | End: 2024-03-13

## 2024-03-08 ASSESSMENT — ENCOUNTER SYMPTOMS: BACK PAIN: 1

## 2024-03-08 NOTE — PROGRESS NOTES
Assessment & Plan     (M54.42,  G89.29) Chronic left-sided low back pain with left-sided sciatica  (primary encounter diagnosis)  Comment: Chronic without improvement. No signs of respiratory distress, vital signs stable, and no red flag signs requiring immediate need for medical attention.  No concerns for COVID-19 or radicular pain that is causing severe or irreversible weakness in extremities or neurological damage at this time.  More conservative therapy has not been successful including 1 burst of steroids, Flexeril, chiropractic support that is ongoing, and a number of different stretches and supportive therapies.  X-ray done at an outside facility has shown to be stable.  Excess viable step is a CT scan to determine cause of ongoing pain, referral to physical therapy for ongoing management, and Ortho referral for likely need for steroid injections in the joints.  Considering severity and ongoing nature of pain, a second steroid burst is justifiable considering it is difficult to determine how quickly patient will be able to access care and specialty.   Plan: Physical Therapy  Referral, Orthopedic         Referral, CT Lumbar Spine w/o         Contrast, predniSONE (DELTASONE) 20 MG tablet      Seek immediate medical attention with symptoms including weakness in the lower extremities, loss of bowel or bladder habits, fever, chills, loss of sensation in the extremities, or redness/heat/swelling of any joints of the lower extremities    Ordering of each unique test  Prescription drug management  I spent a total of 26 minutes on the day of the visit.   Time spent by me doing chart review, history and exam, documentation and further activities per the note      CONSULTATION/REFERRAL to Ortho  FUTURE APPOINTMENTS:       - Follow-up visit in 1 to 2 weeks with ongoing or worsening symptoms if Ortho referral is not yet established       - Follow-up for annual visit or as needed    Subjective   Landen  is a 33 year old, presenting for the following health issues:  Back Pain (Back pain is continuing X 1 month, pain waxes and wanes.)      3/8/2024     9:13 AM   Additional Questions   Roomed by Mirna   Accompanied by Rashid spouse         3/8/2024     9:15 AM   Patient Reported Additional Medications   Patient reports taking the following new medications cyclobenzaprine     Back Pain     History of Present Illness       Back Pain:  Landen Biswas presents for follow up of back pain. Patient's back pain is a new problem.    Original cause of back pain: lifting  First noticed back pain: 1-4 weeks ago  Patient feels back pain: dailyLocation of back pain:  Right lower back, left lower back, right buttock, left buttock, right hip and left hip  Description of back pain: burning, cramping, sharp, shooting and stabbing  Back pain spreads: right buttocks and left buttocks    Since patient first noticed back pain, pain is: always present, but gets better and worse  Does back pain interfere with Landen Biswas's job:  Yes  On a scale of 1-10 (10 being the worst), patient describes pain as:  9  What makes back pain worse: bending, coughing, certain positions, sitting and twisting   Acupuncture: not tried  Acetaminophen: not helpful  Activity or exercise: not helpful  Chiropractor:  Helpful  Cold: helpful  Heat: not helpful  Massage: not tried  Muscle relaxants: not helpful  NSAIDS: not helpful  Opioids: not tried  Physical Therapy: not tried  Rest: not helpful  Steroid Injection: not tried  Stretching: helpful  Surgery: not tried  TENS unit: not helpful  Topical pain relievers: not helpful  Other healthcare providers patient is seeing for back pain: Chiropractor    Landen Biswas eats 2-3 servings of fruits and vegetables daily.Landen Biswas consumes 0 sweetened beverage(s) daily.Landen Biswas exercises with enough effort to increase Landen Biswas's heart rate 20 to 29 minutes per day.  Landen Biswas exercises with enough effort to increase  Landen Biswas's heart rate 3 or less days per week.   Landen Biswas is taking medications regularly.     Landen is a 33-year-old nonbinary patient with a past medical history significant for dyslipidemia, depression, and anxiety, who presents today for ongoing lower back pain with sciatica.  I saw patient in the clinic on February 14 at which time they were complaining of 1 day of sharp and stabbing bilateral low back pain that radiated into bilateral buttocks and hips.  They noted at that time that pain had nearly dropped them to the floor, and seem to present shortly after lifting some fairly heavy boxes at work.  They had manage symptoms at that time with some stretching at home that seemed helpful to loosen the muscles but woke up in the morning with new onset and largely worsening pain.  They were diagnosed at that time with bilateral low back pain with sciatica, and treated with a course of steroids and as needed Flexeril to manage muscle spasming.  They present today with concern for ongoing symptoms that do not seem to be largely improving with the plan of care.  They report that the completed the entire course of steroids with some minor side effects including dehydration, and some increased mood sensitivity.  They report that they feel episodes of back pain may have been largely improved throughout that time, but seems to come back after completion.  They noted they also tried the Flexeril intermittently, but states that spasming episodes were still occurring even when the Flexeril was on board.  They report the pain seems to be more localized to the left side of the lower lumbar region, and remains to radiate into the left buttocks and left leg.  They note that the right side seems to be less involved at this point.  He did follow-up with chiropractic specialty between visits, and had an x-ray of the spine done, which did not note a slipped disc or abnormalities from a Ortho standpoint.  They were noted to  "have an extra vertebrae on x-ray.  Patient reports that episodes seem to be more intermittent, but generally happen when going from sitting to standing, and continue to nearly dropped him to the floor.  They have a difficult time doing any sort of lifting, or ongoing walking, as the pain seems to presented at expected times.  They continue to decline any lower extremity weakness, shooting pain into the feet, numbness or tingling or complete loss of sensation in the lower extremities, involvement of the upper extremities, loss of bowel or bladder habits, chest pain, shortness of breath, fever, swelling in the joints, redness of the joints, or other systemic signs of illness.  They have extremely reduced range of motion related to pain, but had been using things like TENS machines, acupressure mats, and certain stretches and exercises to help continue to loosen up the muscles.  They note some brief relief with chiropractic readjustments, and above-noted supportive therapies, but reports that the pain is not well-managed from a more long-term standpoint.      Review of Systems  Constitutional, HEENT, cardiovascular, pulmonary, gi and gu systems are negative, except as otherwise noted.      Objective    /84 (BP Location: Left arm, Patient Position: Sitting, Cuff Size: Adult Large)   Pulse 99   Temp 97.9  F (36.6  C) (Tympanic)   Resp 12   Ht 1.93 m (6' 4\")   Wt 104.9 kg (231 lb 4.8 oz)   SpO2 99%   BMI 28.15 kg/m    Body mass index is 28.15 kg/m .  Physical Exam   GENERAL: healthy, alert and no distress  NECK: no adenopathy, no asymmetry, masses, or scars  RESP: Easy rate, depth, and effort of breathing.  No visible use of accessory muscles.  No rapid respiratory rate or increased work of breathing appreciated.  CV: peripheral edema, clubbing, or cyanosis  NEURO: Normal strength and tone, mentation intact and speech normal  MS: normal muscle tone, decreased range of motion in all planes of open torso " flexion extension related to discomfort, no cyanosis, clubbing, or edema, peripheral pulses normal, spine exam shows no scoliosis and straight, and all position changes and motions are taken very slowly due to concern for spasming and pain.     Jackie Callejas DNP FNP-C  Family Nurse Practitioner - Same Day Provider  [facility] - [place]          Signed Electronically by: OMER Alston CNP

## 2024-03-11 NOTE — PROGRESS NOTES
"Patient seen at the request of No ref. provider found for an opinion and evaluation of acute low back pain.      HISTORY OF PRESENT ILLNESS:  Landen Biswas is a 33 year old adult who presents with a chief complaint of acute low back pain.     They were referred from the family medicine clinic 3/8/24.     Landen was seen today in the clinic, accompanied by their spouse, Rashid.  They describe a history of infrequent episodes of back pain in the past.  They describe \"throwing out their back\" which typically resolved within 1 to 2 days.    They describe pain which started 1 month ago.  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  They developed pain at work, with trouble getting out of the car.  The pain has remained persistent since that time.    They have tried multiple interventions to help with the pain, including Flexeril, chiropractor treatments, and steroid.  They completed a 5-day course of prednisone (Rx 2/14/2024).  While taking the prednisone, they experienced good pain relief.  Unfortunately when the steroid was done, the pain returned.  A second course of prednisone was prescribed 3/8/2024.  After starting this course of prednisone, they describe the pain as occurring intermittently now, 2-4x per day with flares lasting ~20 seconds.    Today, they describe \"electric, pulsing\" nonradiating left-sided low back, buttocks, and posterior hip pain.  A couple days ago there was also right-sided low back pain, but that resolved.  There is also \"sore, achy\" right thigh pain (anterior, medial, and lateral sides).  The inner thigh is the most bothersome.  The left low back/buttocks pain is the worse than the right thigh pain.    Landen notes an episode of shingles in his early 20s which affected his face and neck (without residual neuropathic pain).  He states that the left-sided pain he is currently " "experiencing feels similar, he feels this is nerve pain.  He denies any rash or skin changes with this episode of low back/leg pain.    They note changes with their gait, but deny feeling like the leg will give out, dragging the foot, or falls.     Aggravating factors include: Transitioning sit to stand, getting in/out of chairs  Relieving factors include: prednisone, Acupressue mat, TENs unit    Today, he rates the pain 2/10.  At its worst over the last week the pain was 9/10.   Patient states sleep is affected, waking up with sharp pains.    He denies bowel or bladder incontinence, saddle anesthesia, unintentional weight loss, fevers/chills, or night sweats.         PRIOR INJURIES/TREATMENT:   Ice/Heat: tried but didn't help    Brace: none    Physical Therapy:   None - but has referral     - Current Pain Medications -   Prednisone Rx 3/8/24 - 3 days left  Tylenol   Escitalopram   Alprazolam -PRN    - Prior/Trialed Pain Medications -   Prednisone, 5-day course, Rx 2/14/2024   cyclobenzaprine - didn't help  Gabapentin for shingles related pain in the past    Prior Procedures:  Date    Procedure   Improvement (%)  none              Prior Related Surgery: none   Other (acupuncture, OMT, CMM, TENS, DME, etc.):   Chiropractor, helped temporarily at visit 2 weeks ago, no lasting relief    Specialists Seen - (with most recent, available notes and clinic visits reviewed)   1. none     IMAGING - reviewed   XR at chiropractor - not available for review - showeed \"extra vertebra\" per pt report    Review Of Systems:  I am responding to those symptoms which are directly relevant to the specific indication for my consultation. I recommend that the patient follow up with their primary or referring provider to pursue any other symptoms which may be of concern.       Medical History:  Landen Biswas  has a past medical history of Anxiety, Anxiety, Depression, Depressive disorder, and Hypercholesterolemia.     Landen Biswas  has a past " "surgical history that includes Tonsillectomy.    Family History  Landen Biswas's family history includes Chronic Obstructive Pulmonary Disease in Landen Biswas's maternal grandmother; Heart Disease in Landen Biswas's maternal grandmother and paternal grandfather; Hyperlipidemia in Landen Biswas's father.     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 Performs ADLs/IADLs independently.    Landen Biswas  reports that Landen Biswas has been smoking vaping device. Landen Biswas has never used smokeless tobacco. Landen Biswas reports that Landen Biswas does not currently use alcohol. Landen Biswas reports that Landen Biswas does not use drugs.        Current Medications:   Landen Biswas has a current medication list which includes the following prescription(s): alprazolam, emtricitabine-tenofovir af, escitalopram, and prednisone.     Allergies:    -- Dust Mite Extract -- Hives and Itching   -- Shellfish-Derived Products -- Anaphylaxis    PHYSICAL EXAMINATION:  BP (!) 142/88 (BP Location: Right arm, Patient Position: Sitting, Cuff Size: Adult Regular)   Pulse 76   Ht 1.93 m (6' 4\")   Wt 104.6 kg (230 lb 8 oz)   SpO2 99%   BMI 28.06 kg/m     --CONSTITUTIONAL: Vital signs as above. No acute distress. The patient is well nourished and well groomed.  --PSYCHIATRIC: The patient is awake, alert, oriented to person, place, time and answering questions appropriately with clear speech. Appropriate mood and affect   --SKIN:  Posterior torso is clean, dry, intact without rashes.   --RESPIRATORY: Normal rhythm and effort. No abnormal accessory muscle breathing patterns noted.   --GROSS MOTOR: Easily arises from a seated position. Toe walking and heel walking are normal.  Standing at the counter for stability, is able to stand on one leg and perform 5 reps lifting onto toes on both sides, but has more difficulty doing so on the right " side.  --STANDING EXAMINATION: Observed walking the length of the kenyon with an antalgic gait-appears to hike one hip up.  Posterior pelvic tilt.  No lateral shift.  --LOWER EXTREMITY MOTOR TESTING:  Plantar flexion left 5/5, right 5/5   Dorsiflexion left 5/5, right 5/5   Great toe MTP extension left 5/5, right 5/5  Knee flexion left 5/5, right 5/5  Knee extension left 5/5, right 5/5   Hip flexion left 5/5, right 5/5  --MUSCULOSKELETAL: Lumbar spine inspection reveals no evidence of deformity. Range of motion is not limited in lumbar flexion, extension, lateral rotation but all elicit pain. No tenderness to palpation lumbar spine EXCEPT left sided low back around L4 level and left buttocks. Straight leg raise negative bilaterally. Sciatic notch non-tender. Facet loading maneuvers are positive on the left and negative on the right.  --SACROILIAC JOINT: Buffy negative.  No pain with palpation of the SI joint.  Fabers mildly pos left, neg right  --HIPS: Full range of motion bilaterally. Fabers mildly pos left, neg right. No pain with logrolling. No pain with palpation of the greater trochanters.   --NEUROLOGIC: 2/4 patellar, medial hamstring, and achilles reflexes bilaterally.  Sensation to light touch is intact in the bilateral L4, L5, and S1 dermatomes. No clonus.    --VASCULAR:  Warm lower limbs bilaterally. There is no pitting edema of the bilateral lower extremities.       ASSESSMENT:  Landen Biswas is a pleasant 33 year old adult who presents with 1 month history of acute low back and leg pain  # nonradiating left-sided low back, buttocks, and posterior hip pain  # right thigh pain (anterior, medial, and lateral sides - worst at inner thigh)      PLAN:  -Add x-ray of the lumbar spine which can be done after today's visit  -Add MRI of the lumbar spine given the persistent pain despite steroid courses, change in his gait, and some weakness noted on exam  -Will update the PT order to urgent 3 to 5-day  referral  -Complete the remaining 3 days of prednisone as prescribed by the PCP  -If he has recurrent/worsening pain after completing the prednisone, he may begin gabapentin, 300 mg at bedtime.  We discussed the option of slowly increasing to TID dosing, but they prefer bedtime dosing only.  -Pending imaging, advised to avoid lifting more than 10 pounds, avoid excessive bending and twisting.  A work letter was provided reflecting those recommendations  -I asked them to make an appointment to follow-up with me at least 1 day after the MRI to review the findings and discuss next steps  -RTC for review of MRI      Ready to learn, no apparent learning barriers.  Education provided on treatment plan according to patient's preferred learning style.  Patient verbalizes understanding.   __________________________________  Sylvia Frank NP  Physical Medicine & Rehabilitation        59 minutes spent by me on the date of the encounter doing chart review, history and exam, documentation and further activities per the note

## 2024-03-14 ENCOUNTER — ANCILLARY PROCEDURE (OUTPATIENT)
Dept: GENERAL RADIOLOGY | Facility: CLINIC | Age: 34
End: 2024-03-14
Attending: NURSE PRACTITIONER
Payer: COMMERCIAL

## 2024-03-14 ENCOUNTER — ANCILLARY PROCEDURE (OUTPATIENT)
Dept: MRI IMAGING | Facility: CLINIC | Age: 34
End: 2024-03-14
Attending: NURSE PRACTITIONER
Payer: COMMERCIAL

## 2024-03-14 ENCOUNTER — OFFICE VISIT (OUTPATIENT)
Dept: PHYSICAL MEDICINE AND REHAB | Facility: CLINIC | Age: 34
End: 2024-03-14
Payer: COMMERCIAL

## 2024-03-14 VITALS
HEIGHT: 76 IN | HEART RATE: 76 BPM | BODY MASS INDEX: 28.07 KG/M2 | WEIGHT: 230.5 LBS | DIASTOLIC BLOOD PRESSURE: 88 MMHG | SYSTOLIC BLOOD PRESSURE: 142 MMHG | OXYGEN SATURATION: 99 %

## 2024-03-14 DIAGNOSIS — M54.41 ACUTE LEFT-SIDED LOW BACK PAIN WITH RIGHT-SIDED SCIATICA: Primary | ICD-10-CM

## 2024-03-14 DIAGNOSIS — R29.898 WEAKNESS OF RIGHT LOWER EXTREMITY: ICD-10-CM

## 2024-03-14 DIAGNOSIS — R26.9 ALTERED GAIT: ICD-10-CM

## 2024-03-14 DIAGNOSIS — M54.42 CHRONIC LEFT-SIDED LOW BACK PAIN WITH LEFT-SIDED SCIATICA: ICD-10-CM

## 2024-03-14 DIAGNOSIS — M54.41 ACUTE LEFT-SIDED LOW BACK PAIN WITH RIGHT-SIDED SCIATICA: ICD-10-CM

## 2024-03-14 DIAGNOSIS — G89.29 CHRONIC LEFT-SIDED LOW BACK PAIN WITH LEFT-SIDED SCIATICA: ICD-10-CM

## 2024-03-14 PROCEDURE — 72100 X-RAY EXAM L-S SPINE 2/3 VWS: CPT | Performed by: STUDENT IN AN ORGANIZED HEALTH CARE EDUCATION/TRAINING PROGRAM

## 2024-03-14 PROCEDURE — 72148 MRI LUMBAR SPINE W/O DYE: CPT | Mod: GC | Performed by: RADIOLOGY

## 2024-03-14 PROCEDURE — 99204 OFFICE O/P NEW MOD 45 MIN: CPT | Performed by: NURSE PRACTITIONER

## 2024-03-14 RX ORDER — GADOBUTROL 604.72 MG/ML
7.5 INJECTION INTRAVENOUS ONCE
OUTPATIENT
Start: 2024-03-14 | End: 2024-03-14

## 2024-03-14 RX ORDER — GABAPENTIN 300 MG/1
300 CAPSULE ORAL AT BEDTIME
Qty: 30 CAPSULE | Refills: 3 | Status: SHIPPED | OUTPATIENT
Start: 2024-03-14 | End: 2024-03-20

## 2024-03-14 RX ORDER — PREDNISONE 20 MG/1
20 TABLET ORAL DAILY
COMMUNITY
End: 2024-08-12

## 2024-03-14 RX ORDER — HEPARIN SODIUM (PORCINE) LOCK FLUSH IV SOLN 100 UNIT/ML 100 UNIT/ML
500 SOLUTION INTRAVENOUS ONCE
OUTPATIENT
Start: 2024-03-14 | End: 2024-03-14

## 2024-03-14 ASSESSMENT — PAIN SCALES - GENERAL: PAINLEVEL: NO PAIN (0)

## 2024-03-14 NOTE — LETTER
[facility] PHYSICAL MEDICINE AND REHABILITATION CLINIC [place]  [address]  [address]RD [address]  [place] [address]  Phone: [phone]  Fax: [phone]    March 14, 2024        Landen Biswas  [address]  [address]  [place] [address]          To whom it may concern:    RE: Landen Biswas    Patient was seen and treated today at our clinic. Please allow him to limit lifting to no more than 10 lbs and avoid excessive bending and twisting for the next 2 weeks (3/28/24) at which time he will have follow up appointment to review need for any work restrictions.        Please contact me for questions or concerns.      Sincerely,      Sylvia Frank NP  Physical Medicine and Rehabilitation, Medical Spine  PM&R clinic Phone: [phone]  PM&R clinic Fax: [phone]

## 2024-03-14 NOTE — PATIENT INSTRUCTIONS
It was a pleasure seeing you in the clinic today.  As discussed, we made the following updates to your plan of care:       An XR order was added today.  I will send you a message about the x-ray result through [employer].     A MRI order was added today, which you can schedule after today's visit.  Radiology will call you to schedule. You may also call [facility] Imaging Scheduling directly if you do not hear from them in the next couple of days.    Imaging scheduling  [facility] [phone] Clinics and Surgery Center [facility] ([facility]), [facility], including [facility], [facility] [phone] [facility], [facility] including [facility], and [facility] [phone] [facility], [facility], [facility], [facility], including [place], [facility], and [facility] [phone] [facility], [facility], [facility] Clinics, including [place], [facility], and [place]     Complete the prednisone as planned.     Gabapentin (Neurontin) 300 mg was prescribed that you can use at bedtime. You may begin the gabapentin after completing the steroid course.   The most common side effect is sedation. Do not drive a motor vehicle until after you are familiar with how the medication may impact your attention and reaction.  Note that it may take several weeks to notice the benefits of this medication.   Do not abruptly stopped this medication prior to consulting with a physician.       Please schedule follow up with me after MRI. (Please schedule the follow up at least 1 day after the MRI to give radiology time to do the report as well.)       Thank you,  Sylvia Frank NP  Physical Medicine and Rehabilitation, Medical Spine  PM&R clinic Phone: [phone]  PM&R clinic Fax:  [phone]

## 2024-03-14 NOTE — LETTER
"3/14/2024       RE: Landen Biswas  [address]  [address]  [place] [address]     Dear Colleague,    Thank you for referring your patient, Landen Biswas, to the [facility] PHYSICAL MEDICINE AND REHABILITATION CLINIC [place] at [facility]. Please see a copy of my visit note below.    Patient seen at the request of No ref. provider found for an opinion and evaluation of acute low back pain.      HISTORY OF PRESENT ILLNESS:  Landen Biswas is a 33 year old adult who presents with a chief complaint of acute low back pain.     They were referred from the family medicine clinic 3/8/24.     Landen was seen today in the clinic, accompanied by their spouse, Rashid.  They describe a history of infrequent episodes of back pain in the past.  They describe \"throwing out their back\" which typically resolved within 1 to 2 days.    They describe pain which started 1 month ago.  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  They developed pain at work, with trouble getting out of the car.  The pain has remained persistent since that time.    They have tried multiple interventions to help with the pain, including Flexeril, chiropractor treatments, and steroid.  They completed a 5-day course of prednisone (Rx 2/14/2024).  While taking the prednisone, they experienced good pain relief.  Unfortunately when the steroid was done, the pain returned.  A second course of prednisone was prescribed 3/8/2024.  After starting this course of prednisone, they describe the pain as occurring intermittently now, 2-4x per day with flares lasting ~20 seconds.    Today, they describe \"electric, pulsing\" nonradiating left-sided low back, buttocks, and posterior hip pain.  A couple days ago there was also right-sided low back pain, but that resolved.  There is also \"sore, achy\" right " "thigh pain (anterior, medial, and lateral sides).  The inner thigh is the most bothersome.  The left low back/buttocks pain is the worse than the right thigh pain.    Landen notes an episode of shingles in his early 20s which affected his face and neck (without residual neuropathic pain).  He states that the left-sided pain he is currently experiencing feels similar, he feels this is nerve pain.  He denies any rash or skin changes with this episode of low back/leg pain.    They note changes with their gait, but deny feeling like the leg will give out, dragging the foot, or falls.     Aggravating factors include: Transitioning sit to stand, getting in/out of chairs  Relieving factors include: prednisone, Acupressue mat, TENs unit    Today, he rates the pain 2/10.  At its worst over the last week the pain was 9/10.   Patient states sleep is affected, waking up with sharp pains.    He denies bowel or bladder incontinence, saddle anesthesia, unintentional weight loss, fevers/chills, or night sweats.         PRIOR INJURIES/TREATMENT:   Ice/Heat: tried but didn't help    Brace: none    Physical Therapy:   None - but has referral     - Current Pain Medications -   Prednisone Rx 3/8/24 - 3 days left  Tylenol   Escitalopram   Alprazolam -PRN    - Prior/Trialed Pain Medications -   Prednisone, 5-day course, Rx 2/14/2024   cyclobenzaprine - didn't help  Gabapentin for shingles related pain in the past    Prior Procedures:  Date    Procedure   Improvement (%)  none              Prior Related Surgery: none   Other (acupuncture, OMT, CMM, TENS, DME, etc.):   Chiropractor, helped temporarily at visit 2 weeks ago, no lasting relief    Specialists Seen - (with most recent, available notes and clinic visits reviewed)   1. none     IMAGING - reviewed   XR at chiropractor - not available for review - showeed \"extra vertebra\" per pt report    Review Of Systems:  I am responding to those symptoms which are directly relevant to the specific " "indication for my consultation. I recommend that the patient follow up with their primary or referring provider to pursue any other symptoms which may be of concern.       Medical History:  Landen Biswas  has a past medical history of Anxiety, Anxiety, Depression, Depressive disorder, and Hypercholesterolemia.     Landen Biswas  has a past surgical history that includes Tonsillectomy.    Family History  Landen Biswas's family history includes Chronic Obstructive Pulmonary Disease in Landen Biswas's maternal grandmother; Heart Disease in Landen Biswas's maternal grandmother and paternal grandfather; Hyperlipidemia in Landen Biswas's father.     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 Performs ADLs/IADLs independently.    Landen Biswas  reports that Landen Biswas has been smoking vaping device. Landen Biswas has never used smokeless tobacco. Landen Biswas reports that Landen Biswas does not currently use alcohol. Landen Biswas reports that Landen Biswas does not use drugs.        Current Medications:   Landen Biswas has a current medication list which includes the following prescription(s): alprazolam, emtricitabine-tenofovir af, escitalopram, and prednisone.     Allergies:    -- Dust Mite Extract -- Hives and Itching   -- Shellfish-Derived Products -- Anaphylaxis    PHYSICAL EXAMINATION:  BP (!) 142/88 (BP Location: Right arm, Patient Position: Sitting, Cuff Size: Adult Regular)   Pulse 76   Ht 1.93 m (6' 4\")   Wt 104.6 kg (230 lb 8 oz)   SpO2 99%   BMI 28.06 kg/m     --CONSTITUTIONAL: Vital signs as above. No acute distress. The patient is well nourished and well groomed.  --PSYCHIATRIC: The patient is awake, alert, oriented to person, place, time and answering questions appropriately with clear speech. Appropriate mood and affect   --SKIN:  Posterior torso is clean, dry, intact without rashes.   --RESPIRATORY: Normal rhythm and " effort. No abnormal accessory muscle breathing patterns noted.   --GROSS MOTOR: Easily arises from a seated position. Toe walking and heel walking are normal.  Standing at the counter for stability, is able to stand on one leg and perform 5 reps lifting onto toes on both sides, but has more difficulty doing so on the right side.  --STANDING EXAMINATION: Observed walking the length of the kenyon with an antalgic gait-appears to hike one hip up.  Posterior pelvic tilt.  No lateral shift.  --LOWER EXTREMITY MOTOR TESTING:  Plantar flexion left 5/5, right 5/5   Dorsiflexion left 5/5, right 5/5   Great toe MTP extension left 5/5, right 5/5  Knee flexion left 5/5, right 5/5  Knee extension left 5/5, right 5/5   Hip flexion left 5/5, right 5/5  --MUSCULOSKELETAL: Lumbar spine inspection reveals no evidence of deformity. Range of motion is not limited in lumbar flexion, extension, lateral rotation but all elicit pain. No tenderness to palpation lumbar spine EXCEPT left sided low back around L4 level and left buttocks. Straight leg raise negative bilaterally. Sciatic notch non-tender. Facet loading maneuvers are positive on the left and negative on the right.  --SACROILIAC JOINT: Buffy negative.  No pain with palpation of the SI joint.  Fabers mildly pos left, neg right  --HIPS: Full range of motion bilaterally. Fabers mildly pos left, neg right. No pain with logrolling. No pain with palpation of the greater trochanters.   --NEUROLOGIC: 2/4 patellar, medial hamstring, and achilles reflexes bilaterally.  Sensation to light touch is intact in the bilateral L4, L5, and S1 dermatomes. No clonus.    --VASCULAR:  Warm lower limbs bilaterally. There is no pitting edema of the bilateral lower extremities.       ASSESSMENT:  Landen Biswas is a pleasant 33 year old adult who presents with 1 month history of acute low back and leg pain  # nonradiating left-sided low back, buttocks, and posterior hip pain  # right thigh pain (anterior,  medial, and lateral sides - worst at inner thigh)      PLAN:  -Add x-ray of the lumbar spine which can be done after today's visit  -Add MRI of the lumbar spine given the persistent pain despite steroid courses, change in his gait, and some weakness noted on exam  -Will update the PT order to urgent 3 to 5-day referral  -Complete the remaining 3 days of prednisone as prescribed by the PCP  -If he has recurrent/worsening pain after completing the prednisone, he may begin gabapentin, 300 mg at bedtime.  We discussed the option of slowly increasing to TID dosing, but they prefer bedtime dosing only.  -Pending imaging, advised to avoid lifting more than 10 pounds, avoid excessive bending and twisting.  A work letter was provided reflecting those recommendations  -I asked them to make an appointment to follow-up with me at least 1 day after the MRI to review the findings and discuss next steps  -RTC for review of MRI      Ready to learn, no apparent learning barriers.  Education provided on treatment plan according to patient's preferred learning style.  Patient verbalizes understanding.   __________________________________      59 minutes spent by me on the date of the encounter doing chart review, history and exam, documentation and further activities per the note         Again, thank you for allowing me to participate in the care of your patient.      Sincerely,    OMER Matos CNP

## 2024-03-17 NOTE — PROGRESS NOTES
"PM&R Follow-Up Visit -     Date of Initial Visit: 3/14/24  LOV: 3/14/24  TD: 3/19/2024     Recall: Landen Biswas is a 33 year old adult who presented with a 1 month history of acute low back pain without known inciting event.      INTERVAL HISTORY:  Patient was last seen in clinic 3/14/24. At that visit, the plan was to obtain XR lumbar spine, MRI lumbar spine, begin PT/HEP, complete 2nd prednisone course, and trial gabapentin.    Landen was seen today in the clinic for follow up, accompanied by spouse Rashid.    At the last visit, Landen described:  -\"electric, pulsing\" nonradiating left-sided low back, buttocks, and posterior hip pain.    -\"sore, achy\" right thigh pain (anterior, medial, and lateral sides)  -The left low back/buttocks pain is the worse than the right thigh pain.    Today, Landen states that the pain is in the same distribution as prior.  Since last time, there was also some pain around the medial distal left lower leg.    Since last time, they completed the prednisone course.  There was some temporary improvement with the steroid, but unfortunately the pain returned yesterday.  After completing the steroid they started gabapentin 300 mg at bedtime which has helped mildly.  They note slightly sluggish feeling in the morning, but generally feel the medication is tolerable.         RECALL HISTORY OF PRESENT ILLNESS:  Landen Biswas is a 33 year old adult who presents with a chief complaint of acute low back pain.     They were referred from the family medicine clinic 3/8/24.     Landen was seen today in the clinic, accompanied by their spouse, Rashid.  They describe a history of infrequent episodes of back pain in the past.  They describe \"throwing out their back\" which typically resolved within 1 to 2 days.    They describe pain which started 1 month ago.  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  They developed pain at work, with trouble getting out of the car.  The pain has remained persistent since that time.    They have tried multiple interventions to help with the pain, including Flexeril, chiropractor treatments, and steroid.  They completed a 5-day course of prednisone (Rx 2/14/2024).  While taking the prednisone, they experienced good pain relief.  Unfortunately when the steroid was done, the pain returned.  A second course of prednisone was prescribed 3/8/2024.  After starting this course of prednisone, they describe the pain as occurring intermittently now, 2-4x per day with flares lasting ~20 seconds.    Today, they describe \"electric, pulsing\" nonradiating left-sided low back, buttocks, and posterior hip pain.  A couple days ago there was also right-sided low back pain, but that resolved.  There is also \"sore, achy\" right thigh pain (anterior, medial, and lateral sides).  The inner thigh is the most bothersome.  The left low back/buttocks pain is the worse than the right thigh pain.    Landen notes an episode of shingles in his early 20s which affected his face and neck (without residual neuropathic pain).  He states that the left-sided pain he is currently experiencing feels similar, he feels this is nerve pain.  He denies any rash or skin changes with this episode of low back/leg pain.    They note changes with their gait, but deny feeling like the leg will give out, dragging the foot, or falls.     Aggravating factors include: Transitioning sit to stand, getting in/out of chairs  Relieving factors include: prednisone, Acupressue mat, TENs unit    Today, he rates the pain 2/10.  At its worst over the last week the pain was 9/10.   Patient states sleep is affected, waking up with sharp pains.    He denies bowel or bladder incontinence, saddle anesthesia, unintentional weight loss, fevers/chills, or night sweats.         PRIOR INJURIES/TREATMENT:   Ice/Heat: tried but didn't help    Brace: " none    Physical Therapy:   PT yair is this Friday, 3/22     - Current Pain Medications -   Gabapentin 300 mg qHS  Tylenol   Escitalopram   Alprazolam -PRN    - Prior/Trialed Pain Medications -   Prednisone Rx 3/8/24   Prednisone, 5-day course, Rx 2/14/2024   cyclobenzaprine - didn't help  Gabapentin for shingles related pain in the past    Prior Procedures:  Date    Procedure   Improvement (%)  none              Prior Related Surgery: none     Other (acupuncture, OMT, CMM, TENS, DME, etc.):   Chiropractor, helped temporarily at visit 2 weeks ago, no lasting relief    Specialists Seen - (with most recent, available notes and clinic visits reviewed)   1. none     IMAGING - reviewed   MR LUMBAR SPINE W/O CONTRAST  3/14/2024   FINDINGS:  There are 5 lumbar type vertebrae, used for the purposes of this  dictation. Conus tip at T12-L1. Normal lumbar vertebral alignment.  Loss of disc height at L5-S1, and L4-L5. No loss of vertebral body  height. Normal marrow signal. Normal cauda equina.     On a level by level basis, the findings are as follows:     L1-2: No spinal canal or neural foraminal stenosis.     L2-3: No spinal canal or neural foraminal stenosis.     L3-4: No spinal canal or neural foraminal stenosis.     L4-5: Broad-based posterior disc bulge with annular fissure without  spinal canal stenosis. Bilateral facet arthropathy. Mild bilateral  foraminal narrowing.     L5-S1: Right central disc extrusion with annular fissure which abuts  the descending right S1 nerve root without impingement in the lateral  recess. There is no spinal canal stenosis. Neural foramen are mildly  to moderately narrowed secondary to facet arthropathy bilaterally.     The visualized paraspinous soft tissues are within normal limits.                                                   IMPRESSION:  Lumbar spondylosis at L4-5 and L5-S1 without significant spinal canal  stenosis. The right central disc extrusion with annular fissure at  L5-S1  abuts the descending right S1 nerve root without impingement.      XR LUMBAR SPINE 2/3 VIEWS  3/14/2024   FINDINGS:   Standing frontal and lateral views of the lumbar spine.     5 lumbar type vertebral bodies are assumed for the purpose of this  dictation.     No acute osseous abnormality. The vertebral body heights are  maintained. Straightening of lumbar lordosis. Trace retrolisthesis of  L5 on S1. Mild disc space narrowing at L5-S1.      The visualized bowel gas pattern is nonobstructive.                                              IMPRESSION:   1. No acute osseous abnormality.  2. Mild degenerative change of the lumbar spine, greatest at L5-S1.    Review Of Systems:  I am responding to those symptoms which are directly relevant to the specific indication for my consultation. I recommend that the patient follow up with their primary or referring provider to pursue any other symptoms which may be of concern.       Medical History:  Landen Biswas  has a past medical history of Anxiety, Anxiety, Depression, Depressive disorder, and Hypercholesterolemia.     Landen Biswas  has a past surgical history that includes Tonsillectomy.    Family History  Landen Biswas's family history includes Chronic Obstructive Pulmonary Disease in Landen Biswas's maternal grandmother; Heart Disease in Landen Biswas's maternal grandmother and paternal grandfather; Hyperlipidemia in Landen Biswas's father.     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 Performs ADLs/IADLs independently.    Landen Biswas  reports that Landen Biswas has been smoking vaping device. Landen Biswas has never used smokeless tobacco. Landen Biswas reports that Landen Biswas does not currently use alcohol. Landen Biswas reports that Landen Biswas does not use drugs.        Current Medications:   Landen Biswas has a current medication list which includes the following prescription(s):  alprazolam, emtricitabine-tenofovir af, escitalopram, and prednisone.     Allergies:    -- Dust Mite Extract -- Hives and Itching   -- Shellfish-Derived Products -- Anaphylaxis    PHYSICAL EXAMINATION:  /87 (BP Location: Right arm, Patient Position: Sitting, Cuff Size: Adult Large)   Pulse 95   Wt 104.2 kg (229 lb 12.8 oz)   SpO2 97%   BMI 27.97 kg/m     --CONSTITUTIONAL: Vital signs as above. No acute distress. The patient is well nourished and well groomed.  --PSYCHIATRIC: The patient is awake, alert, oriented to person, place, time and answering questions appropriately with clear speech. Appropriate mood and affect   --SKIN:  Posterior torso is clean, dry, intact without rashes.   --RESPIRATORY: Normal rhythm and effort. No abnormal accessory muscle breathing patterns noted.   --GROSS MOTOR: Has a significant degree of pain/appears obviously uncomfortable transitioning from sitting to standing. Slowly transitions from sit to stand  --STANDING EXAMINATION:  Posterior pelvic tilt.  No lateral shift.  --LOWER EXTREMITY MOTOR TESTING:  Plantar flexion left 5/5, right 5/5   Dorsiflexion left 5/5, right 5/5   Great toe MTP extension left 5/5, right 5/5  Knee flexion left 5/5, right 5/5  Knee extension left 5/5, right 5/5   Hip flexion left 5/5, right 5/5  --MUSCULOSKELETAL: Lumbar spine inspection reveals no evidence of deformity.  Lumbar flexion and extension limited by pain.  No reproducible tenderness to palpation lumbar spine or paraspinals + pain with lateral rotation. + seated slump test positive on both sides  --NEUROLOGIC: 2/4 patellar and achilles reflexes bilaterally.  Sensation to light touch is intact in the bilateral L4, L5, and S1 dermatomes. No clonus.    --VASCULAR:  Warm lower limbs bilaterally. There is no pitting edema of the bilateral lower extremities.       ASSESSMENT:  Landen Biswas is a pleasant 33 year old adult who presented with 1 month history of atraumatic acute low back and leg  pain  -nonradiating left-sided low back, buttocks, and posterior hip pain  -right thigh pain (anterior, medial, and lateral sides - worst at inner thigh)    # Lumbar radiculopathy  # Lumbar disc herniation  # Lumbar facet arthropathy    X-ray lumbar spine 3/14/2024 shows:  -Mild lumbar spondylosis, most notable at L5-S1    MRI lumbar spine 3/14/2024 shows:  -L4-5 mild bilateral NFS  -L5-S1 right central disc extrusion, abutting descending right S1 nerve root without impingement, mild to moderate bilateral NFS  -Lower lumbar spine facet arthropathy    PLAN:  -X-rays of the lumbar spine and MRI of the lumbar spine were reviewed in detail with patient   -Physical therapy evaluation is this Friday, 3/22  -Tolerating gabapentin 300 mg at bedtime.  May titrate up to 3 times daily as tolerated.  Reviewed instructions/chart in the visit summary outlining plan for titration  -Add 10-day course of naproxen BID  -Landen completed the second course of prednisone without any lasting pain relief.  As such, Landen would like to proceed with an interventional procedure for pain.  Case request added for L5-S1 ILESI with Dr. Edwards at [facility]  -We reviewed activity restrictions including avoiding lifting over 10 pounds and avoiding excessive bending and twisting.  -Advised patient to schedule follow-up in the clinic 2 weeks after the epidural steroid injection to see how they are doing and talk about next steps  -RTC for procedure with Dr. Edwards      Ready to learn, no apparent learning barriers.  Education provided on treatment plan according to patient's preferred learning style.  Patient verbalizes understanding.   __________________________________  Sylvia Frank NP  Physical Medicine & Rehabilitation        50 minutes spent by me on the date of the encounter doing chart review, history and exam, documentation and further activities per the note

## 2024-03-19 ENCOUNTER — OFFICE VISIT (OUTPATIENT)
Dept: PHYSICAL MEDICINE AND REHAB | Facility: CLINIC | Age: 34
End: 2024-03-19
Payer: COMMERCIAL

## 2024-03-19 VITALS
SYSTOLIC BLOOD PRESSURE: 133 MMHG | DIASTOLIC BLOOD PRESSURE: 87 MMHG | HEART RATE: 95 BPM | OXYGEN SATURATION: 97 % | BODY MASS INDEX: 27.97 KG/M2 | WEIGHT: 229.8 LBS

## 2024-03-19 DIAGNOSIS — R26.9 ALTERED GAIT: ICD-10-CM

## 2024-03-19 DIAGNOSIS — M51.369 BULGING LUMBAR DISC: ICD-10-CM

## 2024-03-19 DIAGNOSIS — M54.41 ACUTE LEFT-SIDED LOW BACK PAIN WITH RIGHT-SIDED SCIATICA: Primary | ICD-10-CM

## 2024-03-19 DIAGNOSIS — M54.16 LUMBAR RADICULOPATHY: ICD-10-CM

## 2024-03-19 PROCEDURE — 99215 OFFICE O/P EST HI 40 MIN: CPT | Performed by: NURSE PRACTITIONER

## 2024-03-19 RX ORDER — GABAPENTIN 300 MG/1
300 CAPSULE ORAL 3 TIMES DAILY
Qty: 90 CAPSULE | Refills: 3 | Status: SHIPPED | OUTPATIENT
Start: 2024-03-19 | End: 2024-08-12

## 2024-03-19 RX ORDER — NAPROXEN 500 MG/1
500 TABLET ORAL 2 TIMES DAILY WITH MEALS
Qty: 20 TABLET | Refills: 0 | Status: SHIPPED | OUTPATIENT
Start: 2024-03-19 | End: 2024-08-12

## 2024-03-19 ASSESSMENT — PAIN SCALES - GENERAL: PAINLEVEL: EXTREME PAIN (8)

## 2024-03-19 NOTE — PATIENT INSTRUCTIONS
It was a pleasure seeing you in the clinic today.  As discussed, we made the following updates to your plan of care:      I added an order for epidural steroid injection with Dr Edwards. You will receive a call to help schedule the appointment. I included additional information about the injection below.  Please me know if any questions or concerns.    Gabapentin (Neurontin) was prescribed that you can slowly increase as tolerated, according to the chart below:             AM        PM       BEDTIME    Day 0-5        300       -              300mg  Day 5+         300       300         300mg    Continue to increase your dose as discussed above if you are not experiencing any side effects (in other words - if you experience side effects do not progress to the next week). If you are experiencing any side effects, return to the previous dose that was tolerable and continue until follow-up.     The most common side effect is sedation. Do not drive a motor vehicle until after you are familiar with how the medication may impact your attention and reaction.    Note that it may take several weeks to notice the benefits of this medication.   Do not abruptly stopped this medication prior to consulting with a physician.       Naproxen was prescribed today. This is a NSAID medication to help with pain and inflammation. Take the medication with a full glass of water and food.     Do NOT take this as the same time as other NSAID medications (for example: Advil, Ibuprofen, Aleve, or Naproxen).    NSAIDs have risks if taken long-term, including: gastrointestinal irritation, kidney dysfunction, and cardiovascular effects.      Please schedule follow up appointment 2 weeks after the steroid injection.    Thank you,  Sylvia Frank NP  Physical Medicine and Rehabilitation, Medical Spine  PM&R clinic Phone: [phone]  PM&R clinic Fax: [phone]           Preparing for Spine Injection Therapy              Why Do I Need Spine  Injection Therapy?  Your Health Care Provider is recommending spine injection therapy to  help relieve your back and neck pain. This will be in addition to other therapies such as medications and physical therapy. The purpose of these injections is to reduce the amount of inflammation (swelling, pain, heat, redness, loss of body function) around the nerves thus reducing the amount of pain.     The medications you will receive with the injections will include:   Anesthetic - medication to numb the painful area.    Steroid - medication that prevents or reduces swelling and pain (anti-inflammatory).   To reduce your discomfort during the injection procedure, you will receive a numbing medication injection prior to the placement of the needles. You will be lying on your stomach during the injection procedure. We will use a low-dose x-ray (fluoroscopy) to help guide needle placement.  You must have a  for this procedure. We will need to reschedule your injection if you do not have a  with you.      What are the different types of injections and procedure?  Below, is a brief description of the different types of injections we use to deliver pain medication as close as possible to the nerves in the painful area:    Epidural injection: (picture on the right) Epidural injections place 2 medications in your epidural space.  This is the space alongside your spinal canal (not inside of it). Nerves from your spinal cord pass through this space. The medications will bathe those nerves.       Facet joint injection: These injections place 2 medications into the joints of your neck or spine.   SI joint injection: (picture on the left) deliver pain medications into the Sacroiliac joint that connects the hip bones.   Hip joint injection: deliver pain medication to the joint that connect your hip and femur bones (the femur is the bone in the center of the leg that extends from the knee with the hip).  You will be lying on  your side with your affected side up. For example, if we are injecting your left hip, then you will be lying on your right side.   Medial Branch Block injections: This is a test to see if your pain is coming from a specific nerve. This injection is similar to a facet joint injection but contains only the numbing medication.  You will keep a pain score diary for the rest of the day and the following morning after receiving the injection.    Radiofrequency ablation: This is a procedure that uses radio waves and numbing medication to block the nerves that feel pain at the joint. The pain relief effects can last for a long time, but are not permanent. The procedure is similar to the Medial Branch Block but requires additional testing to ensure that the needle is near the nerve before numbing and ablating it.  Doctors often order sedation for this procedure.  Please see the sections on sedation below.      What Should I Expect if I Receive Sedation? THIS IS ORDERED BY THE PHYSICIAN  This is conscious sedation.  The medications we give to you will help you relax and reduce your anxiety.  You will still be awake for the procedure so we can ask you questions and hear your answers.     What Are My Responsibilities With Sedation?  You must stop eating and drinking 8 (eight) hours before your procedure. You can have clear fluids (water, coffee/tea without milk) up to 2 hours prior to the injections. Nothing by mouth for 2 hours prior to injection.  This includes gum, mints, or chew.  Take your morning medications with a small sip of water.    You must have a  who will check-in with you, and stay in the building while the procedure is underway.  If you do not have a  your sedation will be cancelled.  We will monitor you for at least 30 minutes after the procedure before being discharged home.      What Are the Risks and Complications For This Procedure?  Risks and complication are rare, but can still occur.  You  should understand, discuss, and accept these risks before agreeing to the procedure. They include, but are not limited to:  infection  nerve damage   paralysis  injection failure or a need for further injections or additional procedures  continued or worsening of symptoms/pain,   medication reaction,  dural leak (into the hole covering around the spinal cord. This may cause a a spinal headache)  leak of the medication into the spinal canal, nerves, or blood vessel.  death       What do I need to do before the procedure?   If you do not follow these instructions your procedure may be cancelled.  Tell us if you are on major blood thinners such as Coumadin, Xarelto , Plavix, Eliquis , Pradaxa , or others.    Contact the doctor who prescribed your blood thinner to ask for permission to stop taking it before you have the injection.    Schedule your pain injection procedure after your doctor gave their permission.   We will notify you when to stop and re-start your blood thinner.  Tell us if you have any allergies to contrast dye.  If you do, we may give additional medications to take before the procedure.  Tell us if you are pregnant, or possibly pregnant.  If so, you cannot receive steroid medications or be exposed to fluoroscopic X-rays.  Tell us if you have been sick during the 10 days before the procedure. This includes:   colds   gastrointestinal illness or discomfort  dental sores,   skin infection, or any other type of infection.  Tell us if you have taken antibiotics during the 10 days prior to the procedure.  Do not drink alcohol the night before or on the day of the procedure.  You must shower the night before and on the day of your procedure.  Wear comfortable, clean clothing.  If you have an outside MRI (Magnetic Resonance Imaging photo), please bring it with you.    What Will Happen After the Procedure?  If you did not receive sedation we will monitor you for 15 minutes after the procedure. If you received  sedation, we will monitor you for at least 30 minutes after the procedure     How soon can I expect pain relief?  You have received 2 types of medications with your injection:    Anesthetic - numbing medication which only acts for a few hours    Steroid which may take 3-14 days to be effective.   You can expect to feel your normal pain after the anesthetic wears off, until the steroid becomes effective.    How should I care for myself at home?  Get plenty of rest and avoid twisting, bending movements, heavy lifting, or strenuous activity for the first 24 hours. This will help the steroid be more effective. Medial Branch Block injections will have different discharge instructions.  Those will be discussed at that injection appointment.  Resume your pain medications  Apply ice packs (on for 20 minutes at a time), every 2-3 hours to your injection area for the first 2-3 days to help with pain control.    Avoid heat (pads or water bottles), which can cause the veins to open up, making the steroid less effective. You can use heat after 48 hours.  Take showers only for the first 48 hours.  No baths, hot tubs, swimming, or soaking for 48 hours to reduce the risk of infection.     When should I call the doctor?  Call us if you have any of the following:   Fever more than 100.5 degrees Fahrenheit   Signs of infection   Severe headache   Severe back pain   Increased numbness or weakness in your legs or arms   Loss of bladder or bowel control  Nausea   Other concerns    What is the contact information?  During business hours Monday-Friday 8a-5p call [phone].   After business hours, on weekends and holidays call [phone] and ask for the PM&R doctor on call

## 2024-03-19 NOTE — LETTER
"3/19/2024       RE: Landen Biswas  [address]  [address]  [facility] [address]       Dear Colleague,    Thank you for referring your patient, Landen Biswas, to the [facility] PHYSICAL MEDICINE AND REHABILITATION CLINIC [place] [facility]. Please see a copy of my visit note below.    PM&R Follow-Up Visit -     Date of Initial Visit: 3/14/24  LOV: 3/14/24  TD: 3/19/2024     Recall: Landen Biswas is a 33 year old adult who presented with a 1 month history of acute low back pain without known inciting event.      INTERVAL HISTORY:  Patient was last seen in clinic 3/14/24. At that visit, the plan was to obtain XR lumbar spine, MRI lumbar spine, begin PT/HEP, complete 2nd prednisone course, and trial gabapentin.    Landen was seen today in the clinic for follow up, accompanied by spouse Rashid.    At the last visit, Landen described:  -\"electric, pulsing\" nonradiating left-sided low back, buttocks, and posterior hip pain.    -\"sore, achy\" right thigh pain (anterior, medial, and lateral sides)  -The left low back/buttocks pain is the worse than the right thigh pain.    Today, Landen states that the pain is in the same distribution as prior.  Since last time, there was also some pain around the medial distal left lower leg.    Since last time, they completed the prednisone course.  There was some temporary improvement with the steroid, but unfortunately the pain returned yesterday.  After completing the steroid they started gabapentin 300 mg at bedtime which has helped mildly.  They note slightly sluggish feeling in the morning, but generally feel the medication is tolerable.         RECALL HISTORY OF PRESENT ILLNESS:  Landen Biswas is a 33 year old adult who presents with a chief complaint of acute low back pain.     They were referred from the family medicine clinic 3/8/24.     Landen was seen today in the clinic, accompanied by their spouse, Rashid.  They describe a " "history of infrequent episodes of back pain in the past.  They describe \"throwing out their back\" which typically resolved within 1 to 2 days.    They describe pain which started 1 month ago.  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  They developed pain at work, with trouble getting out of the car.  The pain has remained persistent since that time.    They have tried multiple interventions to help with the pain, including Flexeril, chiropractor treatments, and steroid.  They completed a 5-day course of prednisone (Rx 2/14/2024).  While taking the prednisone, they experienced good pain relief.  Unfortunately when the steroid was done, the pain returned.  A second course of prednisone was prescribed 3/8/2024.  After starting this course of prednisone, they describe the pain as occurring intermittently now, 2-4x per day with flares lasting ~20 seconds.    Today, they describe \"electric, pulsing\" nonradiating left-sided low back, buttocks, and posterior hip pain.  A couple days ago there was also right-sided low back pain, but that resolved.  There is also \"sore, achy\" right thigh pain (anterior, medial, and lateral sides).  The inner thigh is the most bothersome.  The left low back/buttocks pain is the worse than the right thigh pain.    Landen notes an episode of shingles in his early 20s which affected his face and neck (without residual neuropathic pain).  He states that the left-sided pain he is currently experiencing feels similar, he feels this is nerve pain.  He denies any rash or skin changes with this episode of low back/leg pain.    They note changes with their gait, but deny feeling like the leg will give out, dragging the foot, or falls.     Aggravating factors include: Transitioning sit to stand, getting in/out of chairs  Relieving factors include: prednisone, Acupressue mat, TENs " unit    Today, he rates the pain 2/10.  At its worst over the last week the pain was 9/10.   Patient states sleep is affected, waking up with sharp pains.    He denies bowel or bladder incontinence, saddle anesthesia, unintentional weight loss, fevers/chills, or night sweats.       PRIOR INJURIES/TREATMENT:   Ice/Heat: tried but didn't help    Brace: none    Physical Therapy:   PT yair is this Friday, 3/22     - Current Pain Medications -   Gabapentin 300 mg qHS  Tylenol   Escitalopram   Alprazolam -PRN    - Prior/Trialed Pain Medications -   Prednisone Rx 3/8/24   Prednisone, 5-day course, Rx 2/14/2024   cyclobenzaprine - didn't help  Gabapentin for shingles related pain in the past    Prior Procedures:  Date    Procedure   Improvement (%)  none              Prior Related Surgery: none     Other (acupuncture, OMT, CMM, TENS, DME, etc.):   Chiropractor, helped temporarily at visit 2 weeks ago, no lasting relief    Specialists Seen - (with most recent, available notes and clinic visits reviewed)   1. none     IMAGING - reviewed   MR LUMBAR SPINE W/O CONTRAST  3/14/2024   FINDINGS:  There are 5 lumbar type vertebrae, used for the purposes of this  dictation. Conus tip at T12-L1. Normal lumbar vertebral alignment.  Loss of disc height at L5-S1, and L4-L5. No loss of vertebral body  height. Normal marrow signal. Normal cauda equina.     On a level by level basis, the findings are as follows:     L1-2: No spinal canal or neural foraminal stenosis.     L2-3: No spinal canal or neural foraminal stenosis.     L3-4: No spinal canal or neural foraminal stenosis.     L4-5: Broad-based posterior disc bulge with annular fissure without  spinal canal stenosis. Bilateral facet arthropathy. Mild bilateral  foraminal narrowing.     L5-S1: Right central disc extrusion with annular fissure which abuts  the descending right S1 nerve root without impingement in the lateral  recess. There is no spinal canal stenosis. Neural foramen are  mildly  to moderately narrowed secondary to facet arthropathy bilaterally.     The visualized paraspinous soft tissues are within normal limits.                                                   IMPRESSION:  Lumbar spondylosis at L4-5 and L5-S1 without significant spinal canal  stenosis. The right central disc extrusion with annular fissure at  L5-S1 abuts the descending right S1 nerve root without impingement.      XR LUMBAR SPINE 2/3 VIEWS  3/14/2024   FINDINGS:   Standing frontal and lateral views of the lumbar spine.     5 lumbar type vertebral bodies are assumed for the purpose of this  dictation.     No acute osseous abnormality. The vertebral body heights are  maintained. Straightening of lumbar lordosis. Trace retrolisthesis of  L5 on S1. Mild disc space narrowing at L5-S1.      The visualized bowel gas pattern is nonobstructive.                                              IMPRESSION:   1. No acute osseous abnormality.  2. Mild degenerative change of the lumbar spine, greatest at L5-S1.    Review Of Systems:  I am responding to those symptoms which are directly relevant to the specific indication for my consultation. I recommend that the patient follow up with their primary or referring provider to pursue any other symptoms which may be of concern.       Medical History:  Landen Biswas  has a past medical history of Anxiety, Anxiety, Depression, Depressive disorder, and Hypercholesterolemia.     Landen Biswas  has a past surgical history that includes Tonsillectomy.    Family History  Landen Biswas's family history includes Chronic Obstructive Pulmonary Disease in Landen Biswas's maternal grandmother; Heart Disease in Landen Biswas's maternal grandmother and paternal grandfather; Hyperlipidemia in Landen Biswas's father.     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 Performs ADLs/IADLs independently.    Landen Biswas  reports  that Landen Biswas has been smoking vaping device. Landen Biswas has never used smokeless tobacco. Landen Biswas reports that Landen Biswas does not currently use alcohol. Landen Biswas reports that Landen Biswas does not use drugs.        Current Medications:   Landen Biswas has a current medication list which includes the following prescription(s): alprazolam, emtricitabine-tenofovir af, escitalopram, and prednisone.     Allergies:    -- Dust Mite Extract -- Hives and Itching   -- Shellfish-Derived Products -- Anaphylaxis    PHYSICAL EXAMINATION:  /87 (BP Location: Right arm, Patient Position: Sitting, Cuff Size: Adult Large)   Pulse 95   Wt 104.2 kg (229 lb 12.8 oz)   SpO2 97%   BMI 27.97 kg/m     --CONSTITUTIONAL: Vital signs as above. No acute distress. The patient is well nourished and well groomed.  --PSYCHIATRIC: The patient is awake, alert, oriented to person, place, time and answering questions appropriately with clear speech. Appropriate mood and affect   --SKIN:  Posterior torso is clean, dry, intact without rashes.   --RESPIRATORY: Normal rhythm and effort. No abnormal accessory muscle breathing patterns noted.   --GROSS MOTOR: Has a significant degree of pain/appears obviously uncomfortable transitioning from sitting to standing. Slowly transitions from sit to stand  --STANDING EXAMINATION:  Posterior pelvic tilt.  No lateral shift.  --LOWER EXTREMITY MOTOR TESTING:  Plantar flexion left 5/5, right 5/5   Dorsiflexion left 5/5, right 5/5   Great toe MTP extension left 5/5, right 5/5  Knee flexion left 5/5, right 5/5  Knee extension left 5/5, right 5/5   Hip flexion left 5/5, right 5/5  --MUSCULOSKELETAL: Lumbar spine inspection reveals no evidence of deformity.  Lumbar flexion and extension limited by pain.  No reproducible tenderness to palpation lumbar spine or paraspinals + pain with lateral rotation. + seated slump test positive on both sides  --NEUROLOGIC: 2/4 patellar and achilles  reflexes bilaterally.  Sensation to light touch is intact in the bilateral L4, L5, and S1 dermatomes. No clonus.    --VASCULAR:  Warm lower limbs bilaterally. There is no pitting edema of the bilateral lower extremities.       ASSESSMENT:  Landen Biswas is a pleasant 33 year old adult who presented with 1 month history of atraumatic acute low back and leg pain  -nonradiating left-sided low back, buttocks, and posterior hip pain  -right thigh pain (anterior, medial, and lateral sides - worst at inner thigh)    # Lumbar radiculopathy  # Lumbar disc herniation  # Lumbar facet arthropathy    X-ray lumbar spine 3/14/2024 shows:  -Mild lumbar spondylosis, most notable at L5-S1    MRI lumbar spine 3/14/2024 shows:  -L4-5 mild bilateral NFS  -L5-S1 right central disc extrusion, abutting descending right S1 nerve root without impingement, mild to moderate bilateral NFS  -Lower lumbar spine facet arthropathy    PLAN:  -X-rays of the lumbar spine and MRI of the lumbar spine were reviewed in detail with patient   -Physical therapy evaluation is this Friday, 3/22  -Tolerating gabapentin 300 mg at bedtime.  May titrate up to 3 times daily as tolerated.  Reviewed instructions/chart in the visit summary outlining plan for titration  -Add 10-day course of naproxen BID  -Landen completed the second course of prednisone without any lasting pain relief.  As such, Landen would like to proceed with an interventional procedure for pain.  Case request added for L5-S1 ILESI with Dr. Edwards at [facility]  -We reviewed activity restrictions including avoiding lifting over 10 pounds and avoiding excessive bending and twisting.  -Advised patient to schedule follow-up in the clinic 2 weeks after the epidural steroid injection to see how they are doing and talk about next steps  -RTC for procedure with Dr. Edwards      Ready to learn, no apparent learning barriers.  Education provided on treatment plan according to patient's preferred  learning style.  Patient verbalizes understanding.   _______________________________      50 minutes spent by me on the date of the encounter doing chart review, history and exam, documentation and further activities per the note         Again, thank you for allowing me to participate in the care of your patient.      Sincerely,    OMER Matos CNP

## 2024-03-20 ENCOUNTER — MYC MEDICAL ADVICE (OUTPATIENT)
Dept: PHYSICAL MEDICINE AND REHAB | Facility: CLINIC | Age: 34
End: 2024-03-20
Payer: COMMERCIAL

## 2024-03-22 ENCOUNTER — THERAPY VISIT (OUTPATIENT)
Dept: PHYSICAL THERAPY | Facility: CLINIC | Age: 34
End: 2024-03-22
Attending: NURSE PRACTITIONER
Payer: COMMERCIAL

## 2024-03-22 DIAGNOSIS — R26.9 ALTERED GAIT: ICD-10-CM

## 2024-03-22 DIAGNOSIS — R29.898 WEAKNESS OF RIGHT LOWER EXTREMITY: ICD-10-CM

## 2024-03-22 DIAGNOSIS — M54.42 CHRONIC LEFT-SIDED LOW BACK PAIN WITH LEFT-SIDED SCIATICA: ICD-10-CM

## 2024-03-22 DIAGNOSIS — G89.29 CHRONIC LEFT-SIDED LOW BACK PAIN WITH LEFT-SIDED SCIATICA: ICD-10-CM

## 2024-03-22 DIAGNOSIS — M54.41 ACUTE LEFT-SIDED LOW BACK PAIN WITH RIGHT-SIDED SCIATICA: ICD-10-CM

## 2024-03-22 PROCEDURE — 97161 PT EVAL LOW COMPLEX 20 MIN: CPT | Mod: GP

## 2024-03-22 PROCEDURE — 97110 THERAPEUTIC EXERCISES: CPT | Mod: GP

## 2024-03-22 PROCEDURE — 97530 THERAPEUTIC ACTIVITIES: CPT | Mod: GP

## 2024-03-22 NOTE — PROGRESS NOTES
PHYSICAL THERAPY EVALUATION  Type of Visit: Evaluation    See electronic medical record for Abuse and Falls Screening details.    1 month has been having back pain. Was doing routine work and noticed R medial thigh cramp. But now the pain is mostly on the L side of the back. Radiates into hip and down L thigh. MRI shows central disc bulge at L4-5, then L L5-S1. Going from sitting to standing is most difficult activity, will get back spasm. Does not notice any weakness or numbness, just has pain that travels along L4,L5 dermatome. Steroids were helpful for pain, but after stopping, pain came back worse. Will be getting spinal injection next week.     Subjective       Presenting condition or subjective complaint: I am having sharp pains in my lower back that may be attributed to a pinched nerve. My back consistently spasms and I have trouble doing tasks because of this  Date of onset: 03/08/24 (order date)    Relevant medical history: Depression; High blood pressure; Pain at night or rest; Smoking   Dates & types of surgery:      Prior diagnostic imaging/testing results: MRI; X-ray     Prior therapy history for the same diagnosis, illness or injury: No      Living Environment  Social support: With a significant other or spouse   Type of home: Apartment/condo   Stairs to enter the home: Yes 12 Is there a railing: Yes   Ramp: No   Stairs inside the home: No       Help at home: Home management tasks (cooking, cleaning); Home and Yard maintenance tasks; Assist for driving and community activities  Equipment owned:       Employment: Yes   Hobbies/Interests: Reading, going to the movies, gardening, cooking, browsing [employer] stores    Patient goals for therapy: Return to regular functionality; working, dressing, physical activity, etc    Pain assessment: See objective evaluation for additional pain details     Objective   LUMBAR SPINE EVALUATION  PAIN: Pain Level at Rest: 0/10  Pain Level with Use:  9/10  Pain Location: lumbar spine and radiates to L hamstring   Pain is Exacerbated By: sitting to standing, bending, sitting  Pain is Relieved By: steroids, stretching hamstring.   INTEGUMENTARY (edema, incisions): WNL  POSTURE:  posterior pelvic tilt, R shift.   GAIT:   Gait Deviations:  decreased trunk rotation, decreased L hip extension.     ROM:   (Degrees) Left AROM Left PROM  Right AROM Right PROM   Lumbar Side glide Limited, R shifted wnl   Lumbar Flexion Fingers to mid shin, with repetitive motion - discomfort in posterior thigh   Lumbar Extension 25% limitation, no peripheralization     MYOTOMES:    Left Right   T12-L3 (Hip Flexion) 5 5   L2-4 (Quads)  5 5   L4 (Ankle DF) 5 5   L5 (Great Toe Ext) 5 5   S1 (Toe Raise) 4+ 4+     DERMATOMES:    Left Right   L4 Normal (light touch) Normal (light touch)   L5 Normal (light touch) Normal (light touch)   S1 Normal (light touch) Normal (light touch)     NEURAL TENSION:    Left Right   SLR Positive Negative    SLR with DF Positive Negative    Slump Positive Negative      FLEXIBILITY: Decreased hamstrings L  PALPATION:  TTP over L4-sacrum. Hypertonicity to bilateral paraspinals  SPINAL SEGMENTAL CONCLUSIONS:  Mild hypomobility, highly tender to PA assessment in L4, L5    Assessment & Plan   CLINICAL IMPRESSIONS  Medical Diagnosis: Chronic left-sided low back pain with left-sided sciatica    Treatment Diagnosis: Left low back pain with radiating symptoms.   Impression/Assessment: Patient is a 33 year old adult with L back and L LE  complaints.  The following significant findings have been identified: Pain, Decreased ROM/flexibility, Decreased joint mobility, Impaired gait, Impaired muscle performance, Decreased activity tolerance, and Impaired posture. These impairments interfere with their ability to perform self care tasks, work tasks, and recreational activities as compared to previous level of function.     Clinical Decision Making (Complexity):  Clinical  Presentation: Stable/Uncomplicated  Clinical Presentation Rationale: based on medical and personal factors listed in PT evaluation  Clinical Decision Making (Complexity): Low complexity    PLAN OF CARE  Treatment Interventions:  Modalities: Cryotherapy, Dry Needling, E-stim, Hot Pack, Mechanical Traction  Interventions: Gait Training, Manual Therapy, Neuromuscular Re-education, Therapeutic Activity, Therapeutic Exercise    Long Term Goals     PT Goal 1  Goal Identifier: HEP  Goal Description: Patient will be independent with HEP by discharge to improve status outside of therapy  Rationale: to maximize safety and independence with performance of ADLs and functional tasks  Target Date: 05/22/24  PT Goal 2  Goal Identifier: pain  Goal Description: patient will progress to 2 consecutive days with no radiating pain down L LE  Rationale: to maximize safety and independence with performance of ADLs and functional tasks;to maximize safety and independence within the home;to maximize safety and independence with self cares  Target Date: 04/22/24      Frequency of Treatment: 1x week for 1 month, decreasing to 1x every 2 weeks  Duration of Treatment: 2 months    Recommended Referrals to Other Professionals:  none  Education Assessment:   Learner/Method: Patient;Listening;Demonstration;Pictures/Video;No Barriers to Learning    Risks and benefits of evaluation/treatment have been explained.   Patient/Family/caregiver agrees with Plan of Care.     Evaluation Time:     PT Eval, Low Complexity Minutes (02131): 19     Signing Clinician: Hope Francois PT

## 2024-03-28 ENCOUNTER — RADIOLOGY INJECTION OFFICE VISIT (OUTPATIENT)
Dept: PHYSICAL MEDICINE AND REHAB | Facility: CLINIC | Age: 34
End: 2024-03-28
Attending: NURSE PRACTITIONER
Payer: COMMERCIAL

## 2024-03-28 VITALS
OXYGEN SATURATION: 98 % | HEART RATE: 83 BPM | RESPIRATION RATE: 18 BRPM | SYSTOLIC BLOOD PRESSURE: 138 MMHG | BODY MASS INDEX: 28.01 KG/M2 | WEIGHT: 230 LBS | HEIGHT: 76 IN | TEMPERATURE: 98.2 F | DIASTOLIC BLOOD PRESSURE: 86 MMHG

## 2024-03-28 DIAGNOSIS — M54.16 LUMBAR RADICULITIS: ICD-10-CM

## 2024-03-28 PROCEDURE — 62323 NJX INTERLAMINAR LMBR/SAC: CPT | Performed by: PHYSICAL MEDICINE & REHABILITATION

## 2024-03-28 RX ORDER — DEXAMETHASONE SODIUM PHOSPHATE 10 MG/ML
INJECTION, SOLUTION INTRAMUSCULAR; INTRAVENOUS
Status: COMPLETED | OUTPATIENT
Start: 2024-03-28 | End: 2024-03-28

## 2024-03-28 RX ORDER — LIDOCAINE HYDROCHLORIDE 10 MG/ML
INJECTION, SOLUTION EPIDURAL; INFILTRATION; INTRACAUDAL; PERINEURAL
Status: COMPLETED | OUTPATIENT
Start: 2024-03-28 | End: 2024-03-28

## 2024-03-28 RX ADMIN — LIDOCAINE HYDROCHLORIDE 1 ML: 10 INJECTION, SOLUTION EPIDURAL; INFILTRATION; INTRACAUDAL; PERINEURAL at 10:35

## 2024-03-28 RX ADMIN — DEXAMETHASONE SODIUM PHOSPHATE 10 MG: 10 INJECTION, SOLUTION INTRAMUSCULAR; INTRAVENOUS at 10:35

## 2024-03-28 ASSESSMENT — PAIN SCALES - GENERAL
PAINLEVEL: NO PAIN (0)
PAINLEVEL: MODERATE PAIN (5)

## 2024-03-28 NOTE — PATIENT INSTRUCTIONS
DISCHARGE INSTRUCTIONS    During office hours (8:00 a.m.- 4:00 p.m.) questions or concerns may be answered  by calling [facility] Center Navigation Nurses at  [phone].  Messages received after hours will be returned the following business day.      In the case of an emergency, please dial 911 or seek assistance at the nearest Emergency Room/Urgent Care facility.     All Patients:    You may experience an increase in your symptoms for the first 2 days (It may take anywhere between 2 days- 2 weeks for the steroid to have maximum effect).    You may use ice on the injection site, as frequently as 20 minutes each hour if needed.    You may take your pain medicine.    You may continue taking your regular medication after your injection. If you have had a Medial Branch Block you may resume pain medication once your pain diary is completed.    You may shower. No swimming, tub bath or hot tub for 48 hours.  You may remove your bandaid/bandage as soon as you are home.    You may resume light activities, as tolerated.    Resume your usual diet as tolerated.    It is strongly advised that you do not drive for 1-3 hours post injection.    If you have had oral sedation:  Do not drive for 8 hours post injection.      If you have had IV sedation:  Do not drive for 24 hours post injection.  Do not operate hazardous machinery or make important personal/business decisions for 24 hours.      POSSIBLE STEROID SIDE EFFECTS (If steroid/cortisone was used for your procedure)    -If you experience these symptoms, it should only last for a short period    Swelling of the legs              Skin redness (flushing)     Mouth (oral) irritation   Blood sugar (glucose) levels            Sweats                    Mood changes  Headache  Sleeplessness  Weakened immune system for up to 14 days, which could increase the risk of oralia the COVID-19 virus and/or experiencing more severe symptoms of the disease, if exposed.  Decreased  effectiveness of the flu vaccine if given within 2 weeks of the steroid.         POSSIBLE PROCEDURE SIDE EFFECTS  -Call the Spine Center if you are concerned  Increased Pain           Increased numbness/tingling      Nausea/Vomiting          Bruising/bleeding at site      Redness or swelling                                              Difficulty walking      Weakness           Fever greater than 100.5    *In the event of a severe headache after an epidural steroid injection that is relieved by lying down, please call the [facility] to speak with a clinical staff member*

## 2024-03-29 ENCOUNTER — THERAPY VISIT (OUTPATIENT)
Dept: PHYSICAL THERAPY | Facility: CLINIC | Age: 34
End: 2024-03-29
Attending: NURSE PRACTITIONER
Payer: COMMERCIAL

## 2024-03-29 DIAGNOSIS — M54.42 CHRONIC LEFT-SIDED LOW BACK PAIN WITH LEFT-SIDED SCIATICA: Primary | ICD-10-CM

## 2024-03-29 DIAGNOSIS — G89.29 CHRONIC LEFT-SIDED LOW BACK PAIN WITH LEFT-SIDED SCIATICA: Primary | ICD-10-CM

## 2024-03-29 PROCEDURE — 97110 THERAPEUTIC EXERCISES: CPT | Mod: GP

## 2024-03-29 PROCEDURE — 97530 THERAPEUTIC ACTIVITIES: CPT | Mod: GP

## 2024-04-01 NOTE — TELEPHONE ENCOUNTER
----- Message from OMER Matos CNP sent at 3/28/2024  7:57 PM CDT -----  Hi, Could you please help the patient schedule a follow up with me 2 weeks after today's injection?  Thanks,  Sylvia  ----- Message -----  From: Kaycee Edwards MD  Sent: 3/28/2024  11:03 AM CDT  To: OMER Matos CNP

## 2024-05-15 PROBLEM — M54.42 CHRONIC LEFT-SIDED LOW BACK PAIN WITH LEFT-SIDED SCIATICA: Status: RESOLVED | Noted: 2024-03-22 | Resolved: 2024-05-15

## 2024-05-15 PROBLEM — G89.29 CHRONIC LEFT-SIDED LOW BACK PAIN WITH LEFT-SIDED SCIATICA: Status: RESOLVED | Noted: 2024-03-22 | Resolved: 2024-05-15

## 2024-05-15 NOTE — PROGRESS NOTES
DISCHARGE  Reason for Discharge: Patient has failed to schedule further appointments.    Discharge Plan: Patient to continue home program.    Referring Provider:  Sylvia Frank     03/29/24 0500   Appointment Info   Signing clinician's name / credentials Hope Francois, PT, DPT   Total/Authorized Visits 6   Visits Used 2   Medical Diagnosis Chronic left-sided low back pain with left-sided sciatica   PT Tx Diagnosis Left low back pain with radiating symptoms.   Other pertinent information MRI shows L5-S1 disc bulge   Progress Note/Certification   Onset of illness/injury or Date of Surgery 03/08/24  (order date)   Therapy Frequency 1x week for 1 month, decreasing to 1x every 2 weeks   Predicted Duration 2 months   Progress Note Due Date 05/22/24   Progress Note Completed Date 03/22/24   GOALS   PT Goals 2   PT Goal 1   Goal Identifier HEP   Goal Description Patient will be independent with HEP by discharge to improve status outside of therapy   Rationale to maximize safety and independence with performance of ADLs and functional tasks   Goal Progress progressing   Target Date 05/22/24   PT Goal 2   Goal Identifier pain   Goal Description patient will progress to 2 consecutive days with no radiating pain down L LE   Rationale to maximize safety and independence with performance of ADLs and functional tasks;to maximize safety and independence within the home;to maximize safety and independence with self cares   Goal Progress improved pain after the steroid injection and exercises.   Target Date 04/22/24   Subjective Report   Subjective Report Feels much better after exercises and steroid injection. Nerve flossing was helpful. Most pain now is with getting out of passenger side of the car but overall large improvement with symptoms.   Objective Measures   Objective Measures Objective Measure 1;Objective Measure 2   Objective Measure 1   Objective Measure posture   Details improved, upright posture, midline.    Objective Measure 2   Objective Measure press up   Details full press up following mobs .   Treatment Interventions (PT)   Interventions Therapeutic Activity;Therapeutic Procedure/Exercise;Manual Therapy   Therapeutic Procedure/Exercise   Therapeutic Procedures: strength, endurance, ROM, flexibility minutes (50216) 24   Ther Proc 1 HEP   Ther Proc 1 - Details extension based   PTRx Ther Proc 1 Standing Extension   PTRx Ther Proc 1 - Details reviewed pt noting increased pain in low back with exercise discussed decreasing ROM and to continue.    PTRx Ther Proc 2 Prone On Elbows   PTRx Ther Proc 2 - Details reviewed able to increase height of press up compared to last session. Discussed progressing height of press up. Following joint mobs improved ability to reach full press up positioning   PTRx Ther Proc 3 Nerve Glide in Hamstring Position   PTRx Ther Proc 3 - Details verbal review helpful for pain   PTRx Ther Proc 4 Prone Press Ups   PTRx Ther Proc 4 - Details instruction on progression of prone on elbows press up.    PTRx Ther Proc 5 Side Glide in Standing with Extension   PTRx Ther Proc 5 - Details instruction on side glide with extension. x10 pt reported feels relieving.    Skilled Intervention intervention selection   Patient Response/Progress initially high pain in back, improved with prolonged positioning   Therapeutic Activity   Therapeutic Activities: dynamic activities to improve functional performance minutes (85076) 9   Ther Act 1 Education   Ther Act 1 - Details on lifting mechanics while experiencing radiculopathy, education on returning to the gym, adding back in exericses that involve spinal flexion once radiculopathy has resolved to restrength back(dead lifting/squat/RDL)   Skilled Intervention instruction provided   Patient Response/Progress demo's understanding   Manual Therapy   Manual Therapy: Mobilization, MFR, MLD, friction massage minutes (11210) 5   Manual Therapy 1 prone PA   Manual  Therapy 1 - Details to lumbar spine, grade 1 initially, progressed to grade 3. Tolerated well   Skilled Intervention mobilizations   Patient Response/Progress improved mobility with press up   Education   Learner/Method Patient;Listening;Demonstration;Pictures/Video;No Barriers to Learning   Plan   Home program see ptrx   Plan for next session [id number] so RDL/squat. Hip caryn/core strengthening.   Total Session Time   Timed Code Treatment Minutes 38   Total Treatment Time (sum of timed and untimed services) 38

## 2024-06-11 ENCOUNTER — MYC REFILL (OUTPATIENT)
Dept: FAMILY MEDICINE | Facility: CLINIC | Age: 34
End: 2024-06-11
Payer: COMMERCIAL

## 2024-06-11 DIAGNOSIS — F33.0 MILD EPISODE OF RECURRENT MAJOR DEPRESSIVE DISORDER (H): ICD-10-CM

## 2024-06-11 DIAGNOSIS — F41.9 ANXIETY: ICD-10-CM

## 2024-06-11 RX ORDER — ALPRAZOLAM 0.5 MG
0.5 TABLET ORAL DAILY PRN
Qty: 30 TABLET | Refills: 0 | Status: SHIPPED | OUTPATIENT
Start: 2024-06-11

## 2024-06-11 RX ORDER — ESCITALOPRAM OXALATE 10 MG/1
10 TABLET ORAL DAILY
Qty: 90 TABLET | Refills: 0 | Status: SHIPPED | OUTPATIENT
Start: 2024-06-11 | End: 2024-09-18

## 2024-07-14 ENCOUNTER — HEALTH MAINTENANCE LETTER (OUTPATIENT)
Age: 34
End: 2024-07-14

## 2024-08-12 ENCOUNTER — VIRTUAL VISIT (OUTPATIENT)
Dept: URGENT CARE | Facility: CLINIC | Age: 34
End: 2024-08-12
Payer: COMMERCIAL

## 2024-08-12 DIAGNOSIS — R09.81 SINUS CONGESTION: ICD-10-CM

## 2024-08-12 DIAGNOSIS — J01.01 ACUTE RECURRENT MAXILLARY SINUSITIS: Primary | ICD-10-CM

## 2024-08-12 DIAGNOSIS — T78.40XA ALLERGY, INITIAL ENCOUNTER: ICD-10-CM

## 2024-08-12 PROCEDURE — 99213 OFFICE O/P EST LOW 20 MIN: CPT | Mod: 95

## 2024-08-12 RX ORDER — AZELASTINE 1 MG/ML
2 SPRAY, METERED NASAL 2 TIMES DAILY
Qty: 30 ML | Refills: 0 | Status: SHIPPED | OUTPATIENT
Start: 2024-08-12 | End: 2024-08-26

## 2024-08-12 NOTE — PROGRESS NOTES
Landen is a 34 year old adult who presents for a billable video visit.    ASSESSMENT/PLAN:    ICD-10-CM    1. Acute recurrent maxillary sinusitis  J01.01 amoxicillin-clavulanate (AUGMENTIN) 875-125 MG tablet     azelastine (ASTELIN) 0.1 % nasal spray     Adult Allergy/Asthma  Referral      2. Sinus congestion  R09.81 amoxicillin-clavulanate (AUGMENTIN) 875-125 MG tablet     azelastine (ASTELIN) 0.1 % nasal spray     Adult Allergy/Asthma  Referral      3. Allergy, initial encounter  T78.40XA Adult Allergy/Asthma  Referral          Patient was educated on the natural course of condition.  Patient is day 7+ of symptoms and will treat with Augmentin and Astelin nasal spray today.  Take medications as prescribed. Side effects may include stomachache or diarrhea and use of probiotics was discussed. Conservative measures discussed including increased fluids, nasal saline irrigation (neti pot), warm steamy shower, cough suppressants, expectorants (Mucinex), decongestants (Pseudofed), and analgesics (Tylenol and/or Ibuprofen).     Red flag symptoms for urgent evaluation via ED shared.      Follow up with primary care provider with any problems, questions or concerns or if symptoms worsen or fail to improve. Patient verbalized understanding and is agreeable to plan. The patient was discharged ambulatory and in stable condition.        SUBJECTIVE:  Landen Biswas is a 34 year old who presents for concerns about a sinus infection.  States onset of symptoms was 7 day(s) ago.    Course of illness is same.   Has had maxillary pressure as well as nasal congestion, rhinorrhea, facial pain/pressure, and headache.  Patient has been having to miss work due to symptoms.  Predisposing factors include seasonal allergies.   Recent treatment has included: Antihistamine, vaporizer, and tylenol/ibuprofen      Review of Systems  All systems reviewed and negative except per HPI.      OBJECTIVE:  Vitals not done due to  this being a virtual visit    GENERAL: alert and no distress  EYES: Eyes grossly normal to inspection.  No discharge or erythema, or obvious scleral/conjunctival abnormalities.  RESP: No audible wheeze, cough, or visible cyanosis.    SKIN: Visible skin clear. No significant rash, abnormal pigmentation or lesions.  PSYCH: Appropriate affect, tone, and pace of words    Video-Visit Details    Type of service:  Video Visit  Video Start Time: 11:59 AM  Video End Time: 12:09 PM    Originating Location (pt. Location): Home    Distant Location (provider location):  [facility] [employer] [facility]     Platform used for Video Visit: [employer]

## 2024-08-12 NOTE — PATIENT INSTRUCTIONS
Sinusitis Take home Instructions:         1.  Plenty of fluids, rest, warm compresses on face  2.  Mucinex twice daily for at least 4 days  3.  Fatuma Pot or Abdiaziz Med 1x in the morning 1x at night (SALINE MIST SPRAY IS ACCEPTABLE, THOUGH NOT AS EFFECTIVE REPLACEMENT)  4.  Either Claritin (Loratadine), Allegra (Fexofenadine), or Zyrtec (Cetirizine) in the day  5.  Flonase (Fluticasone) or Astelin 2x each nostril twice a day for two weeks, then once each nostril once a day  6. Take antibiotic as directed if prescribed. Take daily probiotic (ex. Culturelle) and yogurt (ex. Activia or greek yogurt) while on antibiotic.         When should you call for help?   Call your doctor now or seek immediate medical care if:    You have new or worse swelling, redness, or pain in your face or around one or both of your eyes.     You have double vision or a change in your vision.     You have a high fever.     You have a severe headache and a stiff neck.     You have mental changes, such as feeling confused or much less alert.   Watch closely for changes in your health, and be sure to contact your doctor if:    You are not getting better as expected.

## 2024-09-17 DIAGNOSIS — F41.9 ANXIETY: ICD-10-CM

## 2024-09-17 DIAGNOSIS — F33.0 MILD EPISODE OF RECURRENT MAJOR DEPRESSIVE DISORDER (H): ICD-10-CM

## 2024-09-18 ENCOUNTER — VIRTUAL VISIT (OUTPATIENT)
Dept: URGENT CARE | Facility: CLINIC | Age: 34
End: 2024-09-18
Payer: COMMERCIAL

## 2024-09-18 DIAGNOSIS — J32.9 OTHER SINUSITIS, UNSPECIFIED CHRONICITY: Primary | ICD-10-CM

## 2024-09-18 PROCEDURE — 99213 OFFICE O/P EST LOW 20 MIN: CPT | Mod: 95 | Performed by: EMERGENCY MEDICINE

## 2024-09-18 RX ORDER — ESCITALOPRAM OXALATE 10 MG/1
10 TABLET ORAL DAILY
Qty: 30 TABLET | Refills: 0 | Status: SHIPPED | OUTPATIENT
Start: 2024-09-18 | End: 2024-10-03

## 2024-09-18 RX ORDER — AZELASTINE 1 MG/ML
1 SPRAY, METERED NASAL 2 TIMES DAILY
Qty: 30 ML | Refills: 3 | Status: SHIPPED | OUTPATIENT
Start: 2024-09-18

## 2024-09-18 NOTE — PROGRESS NOTES
Video visit:  Start time: 11:31 AM  Stop time: 11:41 AM  Duration: 10 minutes  Patient location: In vehicle  Provider location: [facility] virtual provider ([facility]  Platform used for video visit: Summer      CHIEF COMPLAINT: Possible sinus infection.      HPI: Patient is a 34-year-old male who has a history of marked seasonal allergies who has now developed severe maxillary sinus and facial pain.  He has been taking over-the-counter medication for seasonal allergies but no longer can clear the symptoms.  He has some tenacious rhinorrhea as well.      ROS: See HPI otherwise normal.    Allergies   Allergen Reactions    Dust Mite Extract Hives and Itching    Shellfish-Derived Products Anaphylaxis      Current Outpatient Medications   Medication Sig Dispense Refill    amoxicillin-clavulanate (AUGMENTIN) 875-125 MG tablet Take 1 tablet by mouth 2 times daily for 7 days. 14 tablet 0    azelastine (ASTELIN) 0.1 % nasal spray Spray 1 spray into both nostrils 2 times daily. 30 mL 3    ALPRAZolam (XANAX) 0.5 MG tablet Take 1 tablet (0.5 mg) by mouth daily as needed for anxiety Due for annual check up in January 2023 30 tablet 0    emtricitabine-tenofovir AF (DESCOVY) 200-25 MG per tablet Take 1 tablet by mouth daily 90 tablet 0    escitalopram (LEXAPRO) 10 MG tablet Take 1 tablet (10 mg) by mouth daily 90 tablet 0         PE: No acute distress on video visit.  He has slight dysphonia.  He is alert and oriented.  He is nondyspneic appearing speaking in full sentences.        TREATMENT: None.      ASSESSMENT: Acute sinusitis as sequela to allergic rhinitis      DIAGNOSIS: Acute sinusitis      PLAN: Augmentin, Astelin spray.  Follow-up 1 week if symptoms persist, sooner if worse.  Discussed the use of Afrin spray for brief relief and to only use it for 2 days maximum due to rebound which was explained

## 2024-09-28 SDOH — HEALTH STABILITY: PHYSICAL HEALTH: ON AVERAGE, HOW MANY MINUTES DO YOU ENGAGE IN EXERCISE AT THIS LEVEL?: 30 MIN

## 2024-09-28 SDOH — HEALTH STABILITY: PHYSICAL HEALTH: ON AVERAGE, HOW MANY DAYS PER WEEK DO YOU ENGAGE IN MODERATE TO STRENUOUS EXERCISE (LIKE A BRISK WALK)?: 2 DAYS

## 2024-09-28 ASSESSMENT — SOCIAL DETERMINANTS OF HEALTH (SDOH): HOW OFTEN DO YOU GET TOGETHER WITH FRIENDS OR RELATIVES?: NEVER

## 2024-10-03 ENCOUNTER — OFFICE VISIT (OUTPATIENT)
Dept: FAMILY MEDICINE | Facility: CLINIC | Age: 34
End: 2024-10-03
Payer: COMMERCIAL

## 2024-10-03 VITALS
OXYGEN SATURATION: 96 % | DIASTOLIC BLOOD PRESSURE: 91 MMHG | RESPIRATION RATE: 18 BRPM | HEIGHT: 75 IN | HEART RATE: 77 BPM | BODY MASS INDEX: 28.25 KG/M2 | SYSTOLIC BLOOD PRESSURE: 134 MMHG | TEMPERATURE: 98.1 F | WEIGHT: 227.2 LBS

## 2024-10-03 DIAGNOSIS — Z00.00 ROUTINE GENERAL MEDICAL EXAMINATION AT A HEALTH CARE FACILITY: Primary | ICD-10-CM

## 2024-10-03 DIAGNOSIS — Z13.1 SCREENING FOR DIABETES MELLITUS: ICD-10-CM

## 2024-10-03 DIAGNOSIS — Z13.0 SCREENING, ANEMIA, DEFICIENCY, IRON: ICD-10-CM

## 2024-10-03 DIAGNOSIS — F41.9 ANXIETY: ICD-10-CM

## 2024-10-03 DIAGNOSIS — Z23 HIGH PRIORITY FOR 2019-NCOV VACCINE: ICD-10-CM

## 2024-10-03 DIAGNOSIS — F33.0 MILD EPISODE OF RECURRENT MAJOR DEPRESSIVE DISORDER (H): ICD-10-CM

## 2024-10-03 DIAGNOSIS — Z13.6 CARDIOVASCULAR SCREENING; LDL GOAL LESS THAN 160: ICD-10-CM

## 2024-10-03 DIAGNOSIS — Z13.29 SCREENING FOR THYROID DISORDER: ICD-10-CM

## 2024-10-03 DIAGNOSIS — J01.01 ACUTE RECURRENT MAXILLARY SINUSITIS: ICD-10-CM

## 2024-10-03 DIAGNOSIS — L91.8 SKIN TAG: ICD-10-CM

## 2024-10-03 PROBLEM — J01.91 RECURRENT ACUTE SINUSITIS: Status: ACTIVE | Noted: 2024-03-28

## 2024-10-03 LAB — HGB BLD-MCNC: 15.5 G/DL (ref 11.7–17.7)

## 2024-10-03 PROCEDURE — 11200 RMVL SKIN TAGS UP TO&INC 15: CPT | Performed by: PHYSICIAN ASSISTANT

## 2024-10-03 PROCEDURE — 99213 OFFICE O/P EST LOW 20 MIN: CPT | Mod: 25 | Performed by: PHYSICIAN ASSISTANT

## 2024-10-03 PROCEDURE — 99395 PREV VISIT EST AGE 18-39: CPT | Mod: 25 | Performed by: PHYSICIAN ASSISTANT

## 2024-10-03 PROCEDURE — 80053 COMPREHEN METABOLIC PANEL: CPT | Performed by: PHYSICIAN ASSISTANT

## 2024-10-03 PROCEDURE — 36415 COLL VENOUS BLD VENIPUNCTURE: CPT | Performed by: PHYSICIAN ASSISTANT

## 2024-10-03 PROCEDURE — 82570 ASSAY OF URINE CREATININE: CPT | Performed by: PHYSICIAN ASSISTANT

## 2024-10-03 PROCEDURE — 91320 SARSCV2 VAC 30MCG TRS-SUC IM: CPT | Performed by: PHYSICIAN ASSISTANT

## 2024-10-03 PROCEDURE — 80061 LIPID PANEL: CPT | Performed by: PHYSICIAN ASSISTANT

## 2024-10-03 PROCEDURE — 85018 HEMOGLOBIN: CPT | Performed by: PHYSICIAN ASSISTANT

## 2024-10-03 PROCEDURE — 82043 UR ALBUMIN QUANTITATIVE: CPT | Performed by: PHYSICIAN ASSISTANT

## 2024-10-03 PROCEDURE — 90480 ADMN SARSCOV2 VAC 1/ONLY CMP: CPT | Performed by: PHYSICIAN ASSISTANT

## 2024-10-03 PROCEDURE — 84443 ASSAY THYROID STIM HORMONE: CPT | Performed by: PHYSICIAN ASSISTANT

## 2024-10-03 RX ORDER — ESCITALOPRAM OXALATE 10 MG/1
10 TABLET ORAL DAILY
Qty: 90 TABLET | Refills: 1 | Status: SHIPPED | OUTPATIENT
Start: 2024-10-03

## 2024-10-03 ASSESSMENT — PAIN SCALES - GENERAL: PAINLEVEL: NO PAIN (0)

## 2024-10-03 NOTE — PROCEDURES
"Informed consent signed by patient for proposed procedure above.  Patient deferred need for local anesthetic after discussion of options and risks/benefits.  Betadine was used on larger skin tag under right axilla  with alcohol used on smaller right lower axilla skin tag to create sterile field  Forceps used to pull skin tag away from skin while Surgical Cautery tool used to cut tag x 2 cleanly off of skin.   No bleeding noted; topical bacitracin applied x 2  Patient tolerated procedure well with no immediate concerns or questions.    Angeles \"Clark\" YENNIFER Mar     "

## 2024-10-03 NOTE — PATIENT INSTRUCTIONS
Patient Education   Preventive Care Advice   This is general advice given by our system to help you stay healthy. However, your care team may have specific advice just for you. Please talk to your care team about your preventive care needs.  Nutrition  Eat 5 or more servings of fruits and vegetables each day.  Try wheat bread, brown rice and whole grain pasta (instead of white bread, rice, and pasta).  Get enough calcium and vitamin D. Check the label on foods and aim for 100% of the RDA (recommended daily allowance).  Lifestyle  Exercise at least 150 minutes each week  (30 minutes a day, 5 days a week).  Do muscle strengthening activities 2 days a week. These help control your weight and prevent disease.  No smoking.  Wear sunscreen to prevent skin cancer.  Have a dental exam and cleaning every 6 months.  Yearly exams  See your health care team every year to talk about:  Any changes in your health.  Any medicines your care team has prescribed.  Preventive care, family planning, and ways to prevent chronic diseases.  Shots (vaccines)   HPV shots (up to age 26), if you've never had them before.  Hepatitis B shots (up to age 59), if you've never had them before.  COVID-19 shot: Get this shot when it's due.  Flu shot: Get a flu shot every year.  Tetanus shot: Get a tetanus shot every 10 years.  Pneumococcal, hepatitis A, and RSV shots: Ask your care team if you need these based on your risk.  Shingles shot (for age 50 and up)  General health tests  Diabetes screening:  Starting at age 35, Get screened for diabetes at least every 3 years.  If you are younger than age 35, ask your care team if you should be screened for diabetes.  Cholesterol test: At age 39, start having a cholesterol test every 5 years, or more often if advised.  Bone density scan (DEXA): At age 50, ask your care team if you should have this scan for osteoporosis (brittle bones).  Hepatitis C: Get tested at least once in your life.  STIs (sexually  transmitted infections)  Before age 24: Ask your care team if you should be screened for STIs.  After age 24: Get screened for STIs if you're at risk. You are at risk for STIs (including HIV) if:  You are sexually active with more than one person.  You don't use condoms every time.  You or a partner was diagnosed with a sexually transmitted infection.  If you are at risk for HIV, ask about PrEP medicine to prevent HIV.  Get tested for HIV at least once in your life, whether you are at risk for HIV or not.  Cancer screening tests  Cervical cancer screening: If you have a cervix, begin getting regular cervical cancer screening tests starting at age 21.  Breast cancer scan (mammogram): If you've ever had breasts, begin having regular mammograms starting at age 40. This is a scan to check for breast cancer.  Colon cancer screening: It is important to start screening for colon cancer at age 45.  Have a colonoscopy test every 10 years (or more often if you're at risk) Or, ask your provider about stool tests like a FIT test every year or Cologuard test every 3 years.  To learn more about your testing options, visit:   .  For help making a decision, visit:   https://bit.ly/kj01247.  Prostate cancer screening test: If you have a prostate, ask your care team if a prostate cancer screening test (PSA) at age 55 is right for you.  Lung cancer screening: If you are a current or former smoker ages 50 to 80, ask your care team if ongoing lung cancer screenings are right for you.  For informational purposes only. Not to replace the advice of your health care provider. Copyright   2023 [employer]. All rights reserved. Clinically reviewed by the [facility] Transitions Program. [employer] 654044 - REV 01/24.

## 2024-10-03 NOTE — PROGRESS NOTES
"Preventive Care Visit  [facility] PA-C, Family Medicine  Oct 3, 2024      Assessment & Plan     Routine general medical examination at a health care facility  Routine, fasting bloodwork updated today; continue to work on healthy diet and exercise with importance of smoking cessation discussed with patient as well.    Acute recurrent maxillary sinusitis  Patient has allergy appointment already, continue with daily nasal spray and option to follow up with ENT with referral updated today as well.  - Adult ENT  Referral; Future    Anxiety  Mild episode of recurrent major depressive disorder (H)  Long standing, chronic, controlled- regular refills sent to pharmacy.  - escitalopram (LEXAPRO) 10 MG tablet; Take 1 tablet (10 mg) by mouth daily.    Skin tag  - REMOVAL OF SKIN TAGS, FIRST 15    Screening for thyroid disorder  - TSH with free T4 reflex; Future  - TSH with free T4 reflex    Screening, anemia, deficiency, iron  - Hemoglobin; Future  - Hemoglobin    Screening for diabetes mellitus  - Comprehensive metabolic panel; Future  - Albumin Random Urine Quantitative with Creat Ratio; Future  - Comprehensive metabolic panel  - Albumin Random Urine Quantitative with Creat Ratio    CARDIOVASCULAR SCREENING; LDL GOAL LESS THAN 160  - Lipid panel reflex to direct LDL Fasting; Future  - Lipid panel reflex to direct LDL Fasting    High priority for 2019-nCoV vaccine  - COVID-19 12+ (PFIZER)    Patient has been advised of split billing requirements and indicates understanding: Yes        Nicotine/Tobacco Cessation  Landen reports that Landen has been smoking other, vaping device, and cigarettes. Landen has a 5 pack-year smoking history. Landen uses smokeless tobacco.  Nicotine/Tobacco Cessation Plan  Information offered: Patient not interested at this time      BMI  Estimated body mass index is 28.4 kg/m  as calculated from the following:    Height as of this encounter: 1.905 m (6' 3\").    " Weight as of this encounter: 103.1 kg (227 lb 3.2 oz).   Weight management plan: Discussed healthy diet and exercise guidelines    Counseling  Appropriate preventive services were addressed with this patient via screening, questionnaire, or discussion as appropriate for fall prevention, nutrition, physical activity, Tobacco-use cessation, social engagement, weight loss and cognition.  Checklist reviewing preventive services available has been given to the patient.  Reviewed patient's diet, addressing concerns and/or questions.   Landen is at risk for lack of exercise and has been provided with information to increase physical activity for the benefit of Landen's well-being.   Patient is at risk for social isolation and has been provided with information about the benefit of social connection.   Landen is at risk for psychosocial distress and has been provided with information to reduce risk.     See Patient Instructions    Subjective   Landen is a 34 year old, presenting for the following:  Physical and Imm/Inj (COVID-19 VACCINE)        10/3/2024    11:42 AM   Additional Questions   Roomed by Jasmine        Health Care Directive  Patient does not have a Health Care Directive or Living Will: Discussed advance care planning with patient; however, patient declined at this time.      HPI  Answers submitted by the patient for this visit:  Patient Health Questionnaire (Submitted on 10/2/2024)  If you checked off any problems, how difficult have these problems made it for you to do your work, take care of things at home, or get along with other people?: Somewhat difficult  PHQ9 TOTAL SCORE: 3    Discuss about high blood pressure, high off/on for years; knows needs to stop smoking  - family history of hypertension so wondering about next steps  History of recurrent sinus infections- advised to use nasal spray and has restarted with good results; patient has appointment with allergist already as well.  Skin tag removal - larger one  under armpit and smaller on lower axilla of right side        9/28/2024   General Health   How would you rate your overall physical health? Good   Feel stress (tense, anxious, or unable to sleep) Only a little      (!) STRESS CONCERN      9/28/2024   Nutrition   Three or more servings of calcium each day? (!) NO   Diet: Regular (no restrictions)    Other   If other, please elaborate: shellfish avoidance due to allergy   How many servings of fruit and vegetables per day? (!) 2-3   How many sweetened beverages each day? 0-1       Multiple values from one day are sorted in reverse-chronological order         9/28/2024   Exercise   Days per week of moderate/strenous exercise 2 days   Average minutes spent exercising at this level 30 min      (!) EXERCISE CONCERN      9/28/2024   Social Factors   Frequency of gathering with friends or relatives Never   Worry food won't last until get money to buy more No   Food not last or not have enough money for food? No   Do you have housing? (Housing is defined as stable permanent housing and does not include staying ouside in a car, in a tent, in an abandoned building, in an overnight shelter, or couch-surfing.) Yes   Are you worried about losing your housing? No   Lack of transportation? No   Unable to get utilities (heat,electricity)? No      (!) SOCIAL CONNECTIONS CONCERN      9/28/2024   Dental   Dentist two times every year? Yes            9/28/2024   TB Screening   Were you born outside of the [place]? No          Today's PHQ-9 Score:       10/2/2024     6:07 PM   PHQ-9 SCORE   PHQ-9 Total Score MyChart 3 (Minimal depression)   PHQ-9 Total Score 3         9/28/2024   Substance Use   If I could quit smoking, I would Somewhat agree   I want to quit somking, worry about health affects Somewhat agree   Willing to make a plan to quit smoking Somewhat agree   Willing to cut down before quitting Neutral   Alcohol more than 3/day or more than 7/wk Not Applicable   Do you use any other  substances recreationally? No        Social History     Tobacco Use     Smoking status: Every Day     Current packs/day: 0.50     Average packs/day: 0.5 packs/day for 10.0 years (5.0 ttl pk-yrs)     Types: Other, Vaping Device, Cigarettes     Smokeless tobacco: Current     Tobacco comments:     I began smoking cigarettes around 15 but became a regular smoker around 17. I have quit a few times.   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 No            9/28/2024   Contraception/Family Planning   Questions about contraception or family planning No           Reviewed and updated as needed this visit by Provider   Tobacco  Allergies  Meds  Problems  Med Hx  Surg Hx  Fam Hx            Past Medical History:   Diagnosis Date     Anxiety      Anxiety      Depression      Depressive disorder      Hypercholesterolemia      Hypertension      Past Surgical History:   Procedure Laterality Date     TONSILLECTOMY       Labs reviewed in EPIC  BP Readings from Last 3 Encounters:   10/03/24 (!) 134/91   03/28/24 138/86   03/19/24 133/87    Wt Readings from Last 3 Encounters:   10/03/24 103.1 kg (227 lb 3.2 oz)   03/28/24 104.3 kg (230 lb)   03/19/24 104.2 kg (229 lb 12.8 oz)                  Patient Active Problem List   Diagnosis     Anxiety     Mild episode of recurrent major depressive disorder (H)     Dyslipidemia     Recurrent acute sinusitis     Past Surgical History:   Procedure Laterality Date     TONSILLECTOMY         Social History     Tobacco Use     Smoking status: Every Day     Current packs/day: 0.50     Average packs/day: 0.5 packs/day for 10.0 years (5.0 ttl pk-yrs)     Types: Other, Vaping Device, Cigarettes      "Smokeless tobacco: Current     Tobacco comments:     I began smoking cigarettes around 15 but became a regular smoker around 17. I have quit a few times.   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     Family History   Problem Relation Age of Onset     Thyroid Disease Mother         I believe she had her thyroid removed     Hyperlipidemia Father      Substance Abuse Sister      Chronic Obstructive Pulmonary Disease Maternal Grandmother      Heart Disease Maternal Grandmother      Heart Disease Paternal Grandfather      Mental Illness Maternal Grandfather          Current Outpatient Medications   Medication Sig Dispense Refill     ALPRAZolam (XANAX) 0.5 MG tablet Take 1 tablet (0.5 mg) by mouth daily as needed for anxiety Due for annual check up in January 2023 30 tablet 0     azelastine (ASTELIN) 0.1 % nasal spray Spray 1 spray into both nostrils 2 times daily. 30 mL 3     escitalopram (LEXAPRO) 10 MG tablet Take 1 tablet (10 mg) by mouth daily. 90 tablet 1     Allergies   Allergen Reactions     Dust Mite Extract Hives and Itching     Shellfish-Derived Products Anaphylaxis     Recent Labs   Lab Test 07/27/23  0922 03/19/19  1154 11/07/18  0803   * 147* 153*   HDL 31* 37* 39*   TRIG 188* 179* 219*   ALT 98* 60* 47   CR 0.89 0.89 0.96   GFRESTIMATED >90 >60 >90   GFRESTBLACK  --  >60 >90   POTASSIUM 5.2 4.1 4.0   TSH 2.49  --  2.56          Review of Systems  Constitutional, neuro, ENT, endocrine, pulmonary, cardiac, gastrointestinal, genitourinary, musculoskeletal, integument and psychiatric systems are negative, except as otherwise noted.     Objective    Exam  BP (!) 134/91 (BP Location: Right arm, Patient Position: Sitting, Cuff Size: Adult Regular)   Pulse 77   Temp 98.1  F (36.7  C) (Skin)   Resp 18   Ht 1.905 m (6' 3\")   Wt 103.1 kg (227 lb 3.2 oz)   SpO2 96%   BMI 28.40 kg/m     Estimated body mass index is 28.4 kg/m  as " "calculated from the following:    Height as of this encounter: 1.905 m (6' 3\").    Weight as of this encounter: 103.1 kg (227 lb 3.2 oz).    Physical Exam  GENERAL: alert and no distress  EYES: Eyes grossly normal to inspection, PERRL and conjunctivae and sclerae normal  HENT: ear canals and TM's normal, nose and mouth without ulcers or lesions  NECK: no adenopathy, no asymmetry, masses, or scars  RESP: lungs clear to auscultation - no rales, rhonchi or wheezes  CV: regular rate and rhythm, normal S1 S2, no S3 or S4, no murmur, click or rub, no peripheral edema  MS: no gross musculoskeletal defects noted, no edema  SKIN: no suspicious lesions or rashes; multiple small skin tags scattered across next and under right axilla; two larger skin tags with pedunculated bases noted under right axilla  NEURO: Normal strength and tone, mentation intact and speech normal  PSYCH: mentation appears normal, affect normal/bright    See procedure note    Signed Electronically by: Clark Mar PA-C    "

## 2024-10-03 NOTE — NURSING NOTE
Prior to immunization administration, verified patients identity using patient s name and date of birth. Please see Immunization Activity for additional information.     Screening Questionnaire for Adult Immunization    Are you sick today?   No   Do you have allergies to medications, food, a vaccine component or latex?   No   Have you ever had a serious reaction after receiving a vaccination?   No   Do you have a long-term health problem with heart, lung, kidney, or metabolic disease (e.g., diabetes), asthma, a blood disorder, no spleen, complement component deficiency, a cochlear implant, or a spinal fluid leak?  Are you on long-term aspirin therapy?   No   Do you have cancer, leukemia, HIV/AIDS, or any other immune system problem?   No   Do you have a parent, brother, or sister with an immune system problem?   No   In the past 3 months, have you taken medications that affect  your immune system, such as prednisone, other steroids, or anticancer drugs; drugs for the treatment of rheumatoid arthritis, Crohn s disease, or psoriasis; or have you had radiation treatments?   No   Have you had a seizure, or a brain or other nervous system problem?   No   During the past year, have you received a transfusion of blood or blood    products, or been given immune (gamma) globulin or antiviral drug?   No   For women: Are you pregnant or is there a chance you could become       pregnant during the next month?   No   Have you received any vaccinations in the past 4 weeks?   No     Immunization questionnaire answers were all negative.      Patient instructed to remain in clinic for 15 minutes afterwards, and to report any adverse reactions.     Screening performed by Pavel Muñiz MA on 10/3/2024 at 12:30 PM.

## 2024-10-04 LAB
ALBUMIN SERPL BCG-MCNC: 4.6 G/DL (ref 3.5–5.2)
ALP SERPL-CCNC: 87 U/L (ref 40–150)
ALT SERPL W P-5'-P-CCNC: 131 U/L (ref 0–70)
ANION GAP SERPL CALCULATED.3IONS-SCNC: 12 MMOL/L (ref 7–15)
AST SERPL W P-5'-P-CCNC: 78 U/L (ref 0–45)
BILIRUB SERPL-MCNC: 1.1 MG/DL
BUN SERPL-MCNC: 15 MG/DL (ref 6–20)
CALCIUM SERPL-MCNC: 9.4 MG/DL (ref 8.8–10.4)
CHLORIDE SERPL-SCNC: 104 MMOL/L (ref 98–107)
CHOLEST SERPL-MCNC: 236 MG/DL
CREAT SERPL-MCNC: 0.95 MG/DL (ref 0.51–1.17)
CREAT UR-MCNC: 191 MG/DL
EGFRCR SERPLBLD CKD-EPI 2021: >90 ML/MIN/1.73M2
FASTING STATUS PATIENT QL REPORTED: YES
FASTING STATUS PATIENT QL REPORTED: YES
GLUCOSE SERPL-MCNC: 82 MG/DL (ref 70–99)
HCO3 SERPL-SCNC: 25 MMOL/L (ref 22–29)
HDLC SERPL-MCNC: 29 MG/DL
LDLC SERPL CALC-MCNC: 175 MG/DL
MICROALBUMIN UR-MCNC: <12 MG/L
MICROALBUMIN/CREAT UR: NORMAL MG/G{CREAT}
NONHDLC SERPL-MCNC: 207 MG/DL
POTASSIUM SERPL-SCNC: 4.5 MMOL/L (ref 3.4–5.3)
PROT SERPL-MCNC: 6.9 G/DL (ref 6.4–8.3)
SODIUM SERPL-SCNC: 141 MMOL/L (ref 135–145)
TRIGL SERPL-MCNC: 159 MG/DL
TSH SERPL DL<=0.005 MIU/L-ACNC: 1.78 UIU/ML (ref 0.3–4.2)

## 2024-10-04 NOTE — RESULT ENCOUNTER NOTE
"Paulinodavon Landen  Your attached labs are overall within normal limits, but your cholesterol is pretty high and your liver enzymes are more elevated.  Elevated liver enzymes (AST/ALT) can be from dehydration, increased Tylenol and sometimes NSAIDS (ibuprofen, advil, aleve type products) or alcohol use. Please  monitor the intake of these things and we can repeat these levels in 3-6 months. If they remain elevated you may need a ultrasound of your liver.     You don't need cholesterol medication at this time, but continue to work on healthy diet and exercise.    Desired or goal levels are:  CHOLESTEROL: Desirable is less than 200.  LDL cholesterol is the \"bad cholesterol\"     -goal less than 160 if you are low risk    -less than 130 if you have heart disease risk factors     -less than 100 if you have diabetes.     -less than 70 if you have heart disease.   HDL or  \"good cholesterol\"     - greater than 40 for men or 50 for women.  HDL is generally not specifically treated with medication.   Triglycerides are the \"fat\" in the blood     -goal less than 150.  Triglycerides are generally not specifically treated with medication, but can improve with diet and exercise    Your LDL cholesterol is sometimes called \"bad cholesterol\" because it contributes to heart disease and stroke. This can be high due to both genetics (which you can't change) and diet (which you may be able to).  If you aren't already, I recommend increasing whole grains with every meal, and adding flax seed to your food.       One easy way to do this is to eat oatmeal every morning.  Steel cut oats take longer to cook but are better for you than instant, but any oatmeal at all improves your cholesterol.    You can mix or add ground flax seed to hot cereals, yogurt, applesauce, cottage cheese or any sauce mixture or baked good. Ground flax seed can be found in the baking aisle near the flour. The recommended dose of flax seen is 2 heaping tablespoonfuls daily, " "you may want to start with 1 tablespoonful and increase to 2 heaping tablespoonfuls.     You can also take Omega-3 fatty acids (available in capsules) to improve your overall levels, especially your triglycerides. You need 2000 - 4000 mg daily of EPA + DHA. This usually means 4 - 8 capsules a day, depending on the strength you buy or you can try taking Red Yeast Rice (an herbal supplement that contains a natural statin) start with 600 mg once daily with food and increase to 1200 mg twice daily as tolerated.     Here are some ways to improve your nutrition (adapted from the [employer] handout):  Eat less fat (especially butter, Crisco and other saturated fats)  Buy lean cuts of meat; reduce your portions of red meat or substitute poultry or fish  Use skim milk and low-fat dairy products  Eat no more than 4 egg yolks per week  Avoid fried or fast foods that are high in fat  Eat more fruits and vegetables    Also consider starting or increasing your aerobic activity; this is the best way to improve HDL (good) cholesterol. If aerobic activity would be new to you, please talk with me first about what activities are safe for you.    If you are able to make changes, I recommend rechecking your fasting lipids in about 4-6 months to see if your cholesterol has improved.       Please contact the office with any questions or concerns.    Angeles Guidry \"Clark\" YENNIFER Mar    "

## 2024-10-24 ENCOUNTER — MYC REFILL (OUTPATIENT)
Dept: FAMILY MEDICINE | Facility: CLINIC | Age: 34
End: 2024-10-24
Payer: COMMERCIAL

## 2024-10-24 DIAGNOSIS — F41.9 ANXIETY: ICD-10-CM

## 2024-10-24 DIAGNOSIS — F33.0 MILD EPISODE OF RECURRENT MAJOR DEPRESSIVE DISORDER (H): ICD-10-CM

## 2024-10-24 RX ORDER — ESCITALOPRAM OXALATE 10 MG/1
10 TABLET ORAL DAILY
Qty: 90 TABLET | Refills: 1 | OUTPATIENT
Start: 2024-10-24

## 2024-10-26 ASSESSMENT — PATIENT HEALTH QUESTIONNAIRE - PHQ9: SUM OF ALL RESPONSES TO PHQ QUESTIONS 1-9: 12

## 2025-02-10 DIAGNOSIS — F33.0 MILD EPISODE OF RECURRENT MAJOR DEPRESSIVE DISORDER: ICD-10-CM

## 2025-02-10 DIAGNOSIS — F41.9 ANXIETY: ICD-10-CM

## 2025-02-11 RX ORDER — ESCITALOPRAM OXALATE 10 MG/1
10 TABLET ORAL DAILY
Qty: 90 TABLET | Refills: 1 | Status: SHIPPED | OUTPATIENT
Start: 2025-02-11

## 2025-04-03 ENCOUNTER — OFFICE VISIT (OUTPATIENT)
Dept: SLEEP MEDICINE | Facility: CLINIC | Age: 35
End: 2025-04-03
Payer: COMMERCIAL

## 2025-04-03 VITALS
HEIGHT: 75 IN | DIASTOLIC BLOOD PRESSURE: 97 MMHG | SYSTOLIC BLOOD PRESSURE: 145 MMHG | BODY MASS INDEX: 28.95 KG/M2 | WEIGHT: 232.8 LBS | OXYGEN SATURATION: 95 % | HEART RATE: 90 BPM

## 2025-04-03 DIAGNOSIS — R06.83 SNORING: ICD-10-CM

## 2025-04-03 DIAGNOSIS — G47.10 HYPERSOMNIA, UNSPECIFIED: ICD-10-CM

## 2025-04-03 DIAGNOSIS — G47.30 OBSERVED SLEEP APNEA: Primary | ICD-10-CM

## 2025-04-03 ASSESSMENT — SLEEP AND FATIGUE QUESTIONNAIRES
HOW LIKELY ARE YOU TO NOD OFF OR FALL ASLEEP WHILE SITTING AND TALKING TO SOMEONE: WOULD NEVER DOZE
HOW LIKELY ARE YOU TO NOD OFF OR FALL ASLEEP WHILE LYING DOWN TO REST IN THE AFTERNOON WHEN CIRCUMSTANCES PERMIT: HIGH CHANCE OF DOZING
HOW LIKELY ARE YOU TO NOD OFF OR FALL ASLEEP WHILE SITTING QUIETLY AFTER LUNCH WITHOUT ALCOHOL: MODERATE CHANCE OF DOZING
HOW LIKELY ARE YOU TO NOD OFF OR FALL ASLEEP WHILE SITTING INACTIVE IN A PUBLIC PLACE: SLIGHT CHANCE OF DOZING
HOW LIKELY ARE YOU TO NOD OFF OR FALL ASLEEP IN A CAR, WHILE STOPPED FOR A FEW MINUTES IN TRAFFIC: WOULD NEVER DOZE
HOW LIKELY ARE YOU TO NOD OFF OR FALL ASLEEP WHILE SITTING AND READING: HIGH CHANCE OF DOZING
HOW LIKELY ARE YOU TO NOD OFF OR FALL ASLEEP WHILE WATCHING TV: HIGH CHANCE OF DOZING
HOW LIKELY ARE YOU TO NOD OFF OR FALL ASLEEP WHEN YOU ARE A PASSENGER IN A CAR FOR AN HOUR WITHOUT A BREAK: MODERATE CHANCE OF DOZING

## 2025-04-03 ASSESSMENT — PATIENT HEALTH QUESTIONNAIRE - PHQ9
10. IF YOU CHECKED OFF ANY PROBLEMS, HOW DIFFICULT HAVE THESE PROBLEMS MADE IT FOR YOU TO DO YOUR WORK, TAKE CARE OF THINGS AT HOME, OR GET ALONG WITH OTHER PEOPLE: SOMEWHAT DIFFICULT
SUM OF ALL RESPONSES TO PHQ QUESTIONS 1-9: 6
SUM OF ALL RESPONSES TO PHQ QUESTIONS 1-9: 6

## 2025-04-03 NOTE — NURSING NOTE
"Chief Complaint   Patient presents with    Sleep Problem     Needs lots of naps, apnea witnessed by partner, light snoring, dry mouth & sore throat       Initial BP (!) 145/97   Pulse 90   Ht 1.905 m (6' 3\")   Wt 105.6 kg (232 lb 12.8 oz)   SpO2 95%   BMI 29.10 kg/m   Estimated body mass index is 29.1 kg/m  as calculated from the following:    Height as of this encounter: 1.905 m (6' 3\").    Weight as of this encounter: 105.6 kg (232 lb 12.8 oz).    Medication Reconciliation: complete  ESS: 14  Neck circumference: 18 inches / 46 centimeters.    DME: n/a    LOULOU Goff      "

## 2025-04-03 NOTE — PROGRESS NOTES
Sleep Consultation:    Date on this visit: 4/3/2025    Landen Biswas  is referred by No ref. provider found for a sleep consultation.     Primary Physician: No Ref-Primary, Physician     Landen Biswas reports frequent poor quality of sleep and daytime sleepiness for many years.     Landen does snore frequently. Patient's  does complain of gasping and choking. Landen does have witnessed apneas.They never sleep separately.  Patient sleeps on Landen's side and on his back. Landen has frequent morning dry mouth and morning headaches, denies no restless legs.    Patient reports that he is excessively sleepy during the day.  He has experienced sleepiness from around age 24.  In the past, he used to drink excessively.     There is no history of cataplexy.  Patient has a previous history of experiencing sleep paralysis.  He has occasional hypnagogic auditory hallucinations.    Landen goes to sleep at 10:00 PM during the week. Landen wakes up at 7:00 AM. Landen falls asleep in 30 minutes.  Landen has occasional difficulty falling asleep.  Landen wakes up 0-1 times a night for 60 minutes before falling back to sleep.  Landen wakes up to uncertain reasons.  On weekends, Landen goes to sleep at 10:30 PM.  Landen wakes up at 8:00 AM. Landen falls asleep in 30 minutes.       Landen denies any sleep walking, sleep talking, dream enactment.    Landen has occasional difficulty breathing through his nose.      Patient's Hines Sleepiness score 14/24 consistent with excessive  daytime sleepiness.      Landen naps 3-4 times per week for  minutes, feels refreshed after naps. Landen takes some inadvertant naps.  Landen denies closing eyes, dozing, and falling asleep while driving. Patient was counseled on the importance of driving while alert, to pull over if drowsy, or nap before getting into the vehicle if sleepy.      Landen uses 2-3 sodas/day. Last caffeine intake is usually before 3 PM.    Problem List:  Patient Active Problem List    Diagnosis Date Noted     "Recurrent acute sinusitis 03/28/2024     Priority: Medium    Mild episode of recurrent major depressive disorder 01/17/2022     Priority: Medium    Anxiety      Priority: Medium    Dyslipidemia 11/01/2018     Priority: Medium     Formatting of this note might be different from the original.  elevated TC, LDL, TG on 11/2018 and 3/2019 at [facility]          Past Medical/Surgical History:  Past Medical History:   Diagnosis Date    Anxiety     Anxiety     Depression     Depressive disorder     Hypercholesterolemia     Hypertension      Physical Examination:  Vitals: BP (!) 145/97   Pulse 90   Ht 1.905 m (6' 3\")   Wt 105.6 kg (232 lb 12.8 oz)   SpO2 95%   BMI 29.10 kg/m    BMI= Body mass index is 29.1 kg/m .  GENERAL APPEARANCE: healthy, alert, and no distress  HENT: oropharynx crowded  NEURO: mentation intact and speech normal  PSYCH: mentation appears normal and affect normal/bright  Mallampati Class: IV.  Tonsillar Stage: 1  hidden by pillars.    Impression/Plan:    Possible obstructive sleep apnea  Excessive daytime sleepiness     Patient is a 34 years old male, BMI of 29, medical comorbidity of depression, who presents with history of snoring, gasping episodes in sleep, witnessed apneas and daytime sleepiness. Oropharynx is Mallampati class IV on examination. There is high pre test probability for obstructive sleep apnea and an overnight sleep study is recommended for further evaluation.    Will start with home sleep apnea testing.  If home sleep test is negative, we should consider further testing with a PSG.  Although patient does not present with typical symptoms of a central disorder of hypersomnia, this remains a consideration if evaluation for sleep disordered breathing is negative.    Plan:     Home sleep apnea testing     Landen will follow up with me after Landen's sleep study has been competed to review the results and discuss plan of care.       Polysomnography & HST reviewed.  Limitations of HST " reviewed.   Obstructive sleep apnea reviewed.  Complications of untreated sleep apnea were reviewed.      Electronically signed by Dr. Henry Capps, Diplomate, Sleep Medicine, ABPN         CC: No ref. provider found

## 2025-04-22 ENCOUNTER — MYC REFILL (OUTPATIENT)
Dept: FAMILY MEDICINE | Facility: CLINIC | Age: 35
End: 2025-04-22
Payer: COMMERCIAL

## 2025-04-22 DIAGNOSIS — F33.0 MILD EPISODE OF RECURRENT MAJOR DEPRESSIVE DISORDER: ICD-10-CM

## 2025-04-22 DIAGNOSIS — F41.9 ANXIETY: ICD-10-CM

## 2025-04-23 RX ORDER — ESCITALOPRAM OXALATE 10 MG/1
10 TABLET ORAL DAILY
Qty: 90 TABLET | Refills: 1 | OUTPATIENT
Start: 2025-04-23

## 2025-06-14 ASSESSMENT — SLEEP AND FATIGUE QUESTIONNAIRES
HOW LIKELY ARE YOU TO NOD OFF OR FALL ASLEEP WHILE SITTING INACTIVE IN A PUBLIC PLACE: WOULD NEVER DOZE
HOW LIKELY ARE YOU TO NOD OFF OR FALL ASLEEP WHILE SITTING AND TALKING TO SOMEONE: WOULD NEVER DOZE
HOW LIKELY ARE YOU TO NOD OFF OR FALL ASLEEP WHILE WATCHING TV: HIGH CHANCE OF DOZING
HOW LIKELY ARE YOU TO NOD OFF OR FALL ASLEEP WHILE SITTING AND READING: MODERATE CHANCE OF DOZING
HOW LIKELY ARE YOU TO NOD OFF OR FALL ASLEEP WHEN YOU ARE A PASSENGER IN A CAR FOR AN HOUR WITHOUT A BREAK: HIGH CHANCE OF DOZING
HOW LIKELY ARE YOU TO NOD OFF OR FALL ASLEEP WHILE SITTING QUIETLY AFTER LUNCH WITHOUT ALCOHOL: WOULD NEVER DOZE
HOW LIKELY ARE YOU TO NOD OFF OR FALL ASLEEP WHILE LYING DOWN TO REST IN THE AFTERNOON WHEN CIRCUMSTANCES PERMIT: HIGH CHANCE OF DOZING
HOW LIKELY ARE YOU TO NOD OFF OR FALL ASLEEP IN A CAR, WHILE STOPPED FOR A FEW MINUTES IN TRAFFIC: WOULD NEVER DOZE

## 2025-06-18 ENCOUNTER — OFFICE VISIT (OUTPATIENT)
Dept: SLEEP MEDICINE | Facility: CLINIC | Age: 35
End: 2025-06-18
Payer: COMMERCIAL

## 2025-06-18 DIAGNOSIS — G47.10 HYPERSOMNIA, UNSPECIFIED: ICD-10-CM

## 2025-06-18 DIAGNOSIS — G47.30 OBSERVED SLEEP APNEA: ICD-10-CM

## 2025-06-18 DIAGNOSIS — R06.83 SNORING: ICD-10-CM

## 2025-06-18 NOTE — PROGRESS NOTES
Pt is completing a home sleep test. Pt was instructed on how to put on the Noxturnal T3 device and associated equipment before going to bed and given the opportunity to practice putting it on before leaving the sleep center. Pt was reminded to bring the home sleep test kit back to the center tomorrow, at the scheduled time for download and reporting. Patient was instructed to complete study using the following treatment?  None  Neck circumference: 46 CM / 18 inches.  ESS: 14  Stop Ban  Britany Ervin CMA on 2025 at 11:40 AM

## 2025-06-19 ENCOUNTER — DOCUMENTATION ONLY (OUTPATIENT)
Dept: SLEEP MEDICINE | Facility: CLINIC | Age: 35
End: 2025-06-19
Payer: COMMERCIAL

## 2025-06-19 NOTE — PROGRESS NOTES
HST POST-STUDY QUESTIONNAIRE    What time did you go to bed?  10:00 pm  How long do you think it took to fall asleep?  45 mins or less  What time did you wake up to start the day?  7:30 am  Did you get up during the night at all?  Yes  If you woke up, do you remember approximately what time(s)? 1:25 am, 3:15 am, and 4:30 am.  Did you have any difficulty with the equipment?  No  Did you us any type of treatment with this study?  None  Was the head of the bed elevated? No  Did you sleep in a recliner?  No  Did you stop using CPAP at least 3 days before this test?  NA  Any other information you'd like us to know?

## 2025-06-19 NOTE — PROGRESS NOTES
Pt returned HST device. It was downloaded and forwarded data to the clinical specialist for scoring.   Britany Ervin CMA on 6/19/2025 at 8:54 AM

## 2025-07-09 ENCOUNTER — MYC MEDICAL ADVICE (OUTPATIENT)
Dept: SLEEP MEDICINE | Facility: CLINIC | Age: 35
End: 2025-07-09
Payer: COMMERCIAL

## 2025-07-09 DIAGNOSIS — G47.33 OSA (OBSTRUCTIVE SLEEP APNEA): Primary | ICD-10-CM

## 2025-07-28 ENCOUNTER — DOCUMENTATION ONLY (OUTPATIENT)
Dept: SLEEP MEDICINE | Facility: CLINIC | Age: 35
End: 2025-07-28
Payer: COMMERCIAL

## 2025-07-28 NOTE — PROGRESS NOTES
3 day Sleep therapy management telephone visit    Diagnostic AHI:    HST: 9        LEFT VOICE MESSAGE FOR PATIENT TO RETURN CALL      Order settings:  CPAP MIN CPAP MAX   5 cm H2O 15 cm H2O         Device settings:  CPAP MIN CPAP MAX EPR RESMED SOFT RESPONSE SETTING   5.0 cm  H20 15.0 cm  H20 TWO OFF         Patient has the following upcoming sleep appts:  Future Sleep Appointments         Provider Department    10/27/2025 11:30 AM (Arrive by 11:15 AM) Henry Capps MD [facility]            Replacement device: No  STM ordered by provider: Yes     Total time spent on accessing and  interpreting remote patient PAP therapy data  10 minutes    Total time spent counseling, coaching  and reviewing PAP therapy data with patient  0 minutes

## 2025-08-04 DIAGNOSIS — F33.0 MILD EPISODE OF RECURRENT MAJOR DEPRESSIVE DISORDER: ICD-10-CM

## 2025-08-04 DIAGNOSIS — F41.9 ANXIETY: ICD-10-CM

## 2025-08-05 RX ORDER — ESCITALOPRAM OXALATE 10 MG/1
10 TABLET ORAL DAILY
Qty: 90 TABLET | Refills: 1 | Status: SHIPPED | OUTPATIENT
Start: 2025-08-05

## 2025-08-13 ENCOUNTER — DOCUMENTATION ONLY (OUTPATIENT)
Dept: SLEEP MEDICINE | Facility: CLINIC | Age: 35
End: 2025-08-13
Payer: COMMERCIAL

## 2025-08-18 ENCOUNTER — TELEPHONE (OUTPATIENT)
Dept: SLEEP MEDICINE | Facility: CLINIC | Age: 35
End: 2025-08-18
Payer: COMMERCIAL